# Patient Record
Sex: FEMALE | Race: WHITE | Employment: UNEMPLOYED | URBAN - METROPOLITAN AREA
[De-identification: names, ages, dates, MRNs, and addresses within clinical notes are randomized per-mention and may not be internally consistent; named-entity substitution may affect disease eponyms.]

---

## 2017-04-03 ENCOUNTER — OFFICE VISIT (OUTPATIENT)
Dept: FAMILY MEDICINE CLINIC | Age: 57
End: 2017-04-03

## 2017-04-03 VITALS
SYSTOLIC BLOOD PRESSURE: 116 MMHG | DIASTOLIC BLOOD PRESSURE: 68 MMHG | HEART RATE: 96 BPM | TEMPERATURE: 98.3 F | OXYGEN SATURATION: 96 % | WEIGHT: 174.6 LBS | BODY MASS INDEX: 27.35 KG/M2

## 2017-04-03 DIAGNOSIS — L25.9 CONTACT DERMATITIS, UNSPECIFIED CONTACT DERMATITIS TYPE, UNSPECIFIED TRIGGER: ICD-10-CM

## 2017-04-03 DIAGNOSIS — R21 RASH: Primary | ICD-10-CM

## 2017-04-03 RX ORDER — METHYLPREDNISOLONE ACETATE 80 MG/ML
80 INJECTION, SUSPENSION INTRA-ARTICULAR; INTRALESIONAL; INTRAMUSCULAR; SOFT TISSUE ONCE
Qty: 1 VIAL | Refills: 0
Start: 2017-04-03 | End: 2017-04-03

## 2017-04-03 RX ORDER — CETIRIZINE HCL 10 MG
10 TABLET ORAL DAILY
Qty: 30 TAB | Refills: 0 | Status: SHIPPED | OUTPATIENT
Start: 2017-04-03 | End: 2020-07-01

## 2017-04-03 NOTE — MR AVS SNAPSHOT
Visit Information Date & Time Provider Department Dept. Phone Encounter #  
 4/3/2017  1:15 PM Kadie Chester [facility] [phone] Upcoming Health Maintenance Date Due  
 BREAST CANCER SCRN MAMMOGRAM 11/23/2018 COLONOSCOPY 6/1/2020 DTaP/Tdap/Td series (2 - Td) 10/27/2025 Allergies as of 4/3/2017  Review Complete On: 4/3/2017 By: Kadie Chester MD  
  
 Severity Noted Reaction Type Reactions Morphine High 03/19/2010   Side Effect Anaphylaxis Current Immunizations  Reviewed on 9/27/2016 Name Date H1N1 FLU VACCINE 2/9/2010 Influenza Vaccine 10/9/2013 Influenza Vaccine Horacio Feeler) 9/27/2016, 11/7/2014 Influenza Vaccine (Quad) PF 10/27/2015 Influenza Vaccine Split 10/3/2012, 10/25/2011 Pneumococcal Vaccine (Unspecified Type) 10/3/2012 Tdap 10/27/2015 Not reviewed this visit You Were Diagnosed With   
  
 Codes Comments Rash    -  Primary ICD-10-CM: R21 
ICD-9-CM: 782.1 Contact dermatitis, unspecified contact dermatitis type, unspecified trigger     ICD-10-CM: L25.9 ICD-9-CM: 692.9 Vitals BP Pulse Temp Weight(growth percentile) SpO2 BMI  
 116/68 (BP 1 Location: Left arm, BP Patient Position: Sitting) 96 98.3 °F (36.8 °C) 174 lb 9.6 oz (79.2 kg) 96% 27.35 kg/m2 OB Status Smoking Status Hysterectomy Never Smoker BMI and BSA Data Body Mass Index Body Surface Area  
 27.35 kg/m 2 1.93 m 2 Preferred Pharmacy Pharmacy Name Phone [phone] [facility] [phone] Your Updated Medication List  
  
   
This list is accurate as of: 4/3/17  2:22 PM.  Always use your most recent med list.  
  
  
  
  
 azaTHIOprine 50 mg tablet Commonly known as:  [employer] Take 50 mg by mouth daily. cetirizine 10 mg tablet Commonly known as:  ZYRTEC Take 1 Tab by mouth daily. cyanocobalamin 1,000 mcg/mL injection Commonly known as:  VITAMIN B12  
1,000 mcg by IntraMUSCular route every fourteen (14) days. cycloSPORINE 100 mg capsule Commonly known as:  SandIMMUNE Take 75 mg by mouth two (2) times a day.  
  
 gabapentin 800 mg tablet Commonly known as:  NEURONTIN Take 800 mg by mouth two (2) times a day. methylPREDNISolone acetate 80 mg/mL injection Commonly known as:  DEPO-MEDROL 1 mL by IntraMUSCular route once for 1 dose. MIRAPEX 0.125 mg tablet Generic drug:  pramipexole Take 0.125 mg by mouth nightly. pravastatin 20 mg tablet Commonly known as:  PRAVACHOL Take 1 Tab by mouth nightly. predniSONE 5 mg tablet Commonly known as:  Niya Duane Take 2.5 mg by mouth daily. VITAMIN D2 50,000 unit capsule Generic drug:  ergocalciferol Take 50,000 Units by mouth every seven (7) days. ZANTAC PO Take 150 mg by mouth daily as needed. Prescriptions Sent to Pharmacy Refills  
 cetirizine (ZYRTEC) 10 mg tablet 0 Sig: Take 1 Tab by mouth daily. Class: Normal  
 Pharmacy: [address] Ph #: [phone] Route: Oral  
  
We Performed the Following METHYLPREDNISOLONE ACETATE INJECTION 80 MG [ [facility]] [place] THER/PROPH/DIAG INJECTION, SUBCUT/IM [id number] CPT(R)] Patient Instructions Zyrtec and Zantac daily. Call if not better in 2-3 days. Rash: Care Instructions Your Care Instructions A rash is any irritation or inflammation of the skin. Rashes have many possible causes, including allergy, infection, illness, heat, and emotional stress. Follow-up care is a key part of your treatment and safety. Be sure to make and go to all appointments, and call your doctor if you are having problems. Its also a good idea to know your test results and keep a list of the medicines you take. How can you care for yourself at home? · Wash the area with water only. Soap can make dryness and itching worse. Pat dry. · Put cold, wet cloths on the rash to reduce itching. · Keep cool, and stay out of the sun. · Leave the rash open to the air as much of the time as possible. · Sometimes petroleum jelly (Vaseline) can help relieve the discomfort caused by a rash. A moisturizing lotion, such as Cetaphil, also may help. Calamine lotion may help for rashes caused by contact with something (such as a plant or soap) that irritated the skin. Use it 3 or 4 times a day. · If your doctor prescribed a cream, use it as directed. If your doctor prescribed medicine, take it exactly as directed. · If your rash itches so badly that it interferes with your normal activities, take an over-the-counter antihistamine, such as diphenhydramine (Benadryl) or loratadine (Claritin). Read and follow all instructions on the label. When should you call for help? Call your doctor now or seek immediate medical care if: 
· You have signs of infection, such as: 
¨ Increased pain, swelling, warmth, or redness. ¨ Red streaks leading from the area. ¨ Pus draining from the area. ¨ A fever. · You have joint pain along with the rash. Watch closely for changes in your health, and be sure to contact your doctor if: 
· Your rash is changing or getting worse. For example, call if you have pain along with the rash, the rash is spreading, or you have new blisters. · You do not get better after 1 week. Where can you learn more? Go to http://tin-tabby.info/. Enter Q769 in the search box to learn more about \"Rash: Care Instructions. \" Current as of: October 13, 2016 Content Version: 11.2 © 4453-6428 [employer]. Care instructions adapted under license by [employer] (which disclaims liability or warranty for this information).  If you have questions about a medical condition or this instruction, always ask your healthcare professional. [address] any warranty or liability for your use of this information. Introducing [facility] SERVICES! [employer] introduces Translimit patient portal. Now you can access parts of your medical record, email your doctor's office, and request medication refills online. 1. In your internet browser, go to [email or web address] 2. Click on the First Time User? Click Here link in the Sign In box. You will see the New Member Sign Up page. 3. Enter your Translimit Access Code exactly as it appears below. You will not need to use this code after youve completed the sign-up process. If you do not sign up before the expiration date, you must request a new code. · Translimit Access Code: [email or web address] Expires: 7/2/2017  2:21 PM 
 
4. Enter the last four digits of your Social Security Number (xxxx) and Date of Birth (mm/dd/yyyy) as indicated and click Submit. You will be taken to the next sign-up page. 5. Create a Translimit ID. This will be your Translimit login ID and cannot be changed, so think of one that is secure and easy to remember. 6. Create a Translimit password. You can change your password at any time. 7. Enter your Password Reset Question and Answer. This can be used at a later time if you forget your password. 8. Enter your e-mail address. You will receive e-mail notification when new information is available in [address]. 9. Click Sign Up. You can now view and download portions of your medical record. 10. Click the Download Summary menu link to download a portable copy of your medical information. If you have questions, please visit the Frequently Asked Questions section of the Translimit website. Remember, Translimit is NOT to be used for urgent needs. For medical emergencies, dial 911. Now available from your iPhone and Android! Please provide this summary of care documentation to your next provider. Your primary care clinician is listed as [place]ck[place]ley [place]. If you have any questions after today's visit, please call [phone].

## 2017-04-03 NOTE — PROGRESS NOTES
Chief Complaint   Patient presents with    Rash     trunk, neck, arms, legs and back. Methylprednisolone 80mg given today per Dr. Rosa Suarez.

## 2017-04-03 NOTE — PROGRESS NOTES
Subjective:   Maria Dolores Gillis is a 56 y.o. female who complains of itching rash starting on chest, spreading to back and trunk. Worse with warm bath. No recent medication changes. Currently on prednisone 2.5mg daily for immunosuppression after kidney transplant. Had a slight cold. No history of allergies. Tried benadryl, hydrocortisone cream, calamine lotion and did not help it. Past Medical History:   Diagnosis Date    Basal cell carcinoma in situ of skin     removed from left cheek and left arm    Hip pain     dr. Asha Barbour Hyperlipidemia     Kidney replaced by transplant 1990    unknown etiology, Dr. Wan Up Peripheral neuropathy [facility])      Social History   Substance Use Topics    Smoking status: Never Smoker    Smokeless tobacco: Never Used    Alcohol use No     Current Outpatient Prescriptions on File Prior to Visit   Medication Sig Dispense Refill    pravastatin (PRAVACHOL) 20 mg tablet Take 1 Tab by mouth nightly. 90 Tab 3    azaTHIOprine (IMURAN) 50 mg tablet Take 50 mg by mouth daily.  ergocalciferol (VITAMIN D2) 50,000 unit capsule Take 50,000 Units by mouth every seven (7) days.  RANITIDINE HCL (ZANTAC PO) Take 150 mg by mouth daily as needed.  cyclosporine (SANDIMMUNE) 100 mg capsule Take 75 mg by mouth two (2) times a day.  predniSONE (DELTASONE) 5 mg tablet Take 2.5 mg by mouth daily.  pramipexole (MIRAPEX) 0.125 mg tablet Take 0.125 mg by mouth nightly.  gabapentin (NEURONTIN) 800 mg tablet Take 800 mg by mouth two (2) times a day.  cyanocobalamin (VITAMIN B12) 1,000 mcg/mL injection 1,000 mcg by IntraMUSCular route every fourteen (14) days. No current facility-administered medications on file prior to visit. Allergies   Allergen Reactions    Morphine Anaphylaxis        Review of Systems  Pertinent items are noted in HPI.     Objective:     Visit Vitals    /68 (BP 1 Location: Left arm, BP Patient Position: Sitting)    Pulse 96    Temp 98.3 °F (36.8 °C)    Wt 174 lb 9.6 oz (79.2 kg)    SpO2 96%    BMI 27.35 kg/m2     General:   alert, cooperative and no distress   Eyes: conjunctivae/scleras clear. PERRL, EOM's intact   Ears: External auditory canals clear, tympanic membranes clear       Mouth:  No oral lesions, mild pharyngeal erythema, no exudates   skin Red maculopapular rash spread on face, trunk, arms and extremities   Heart: S1 and S2 normal,no murmurs noted    Lungs: Clear to auscultation bilaterally, no increased work of breathing       Extremities: No edema or cyanosis              Assessment/Plan:       ICD-10-CM ICD-9-CM    1. Rash R21 782.1    2. Contact dermatitis, unspecified contact dermatitis type, unspecified trigger - will try steroid, if having persistent symptoms will return to transplant clinic to be sure there's no graft vs host or opportunistic infection. L25.9 692.9 METHYLPREDNISOLONE ACETATE INJECTION 80 MG      [place] THER/PROPH/DIAG INJECTION, SUBCUT/IM       Orders Placed This Encounter    METHYLPREDNISOLONE ACETATE INJECTION 80 MG     Order Specific Question:   Dose     Answer:   80mg     Order Specific Question:   Site     Answer:   RIGHT DELTOID     Order Specific Question:   Expiration Date     Answer:   2019     Order Specific Question:   Lot#     Answer:   [id number]     Order Specific Question:        Answer:   Melene Parents     Order Specific Question:   Charge Quantity? Answer:   1     Order Specific Question:   Perfomed by/Witnessed by: Answer:   aps     Order Specific Question:   NDC#     Answer:   [id number]    [place] THER/PROPH/DIAG INJECTION, SUBCUT/IM    cetirizine (ZYRTEC) 10 mg tablet     Sig: Take 1 Tab by mouth daily. Dispense:  30 Tab     Refill:  0    methylPREDNISolone acetate (DEPO-MEDROL) 80 mg/mL injection     Si mL by IntraMUSCular route once for 1 dose. Dispense:  1 Vial     Refill:  0       Verbal and written instructions (see AVS) provided.   Patient expresses understanding of diagnosis and treatment plan.

## 2017-04-03 NOTE — PATIENT INSTRUCTIONS
Zyrtec and Zantac daily. Call if not better in 2-3 days. Rash: Care Instructions  Your Care Instructions  A rash is any irritation or inflammation of the skin. Rashes have many possible causes, including allergy, infection, illness, heat, and emotional stress. Follow-up care is a key part of your treatment and safety. Be sure to make and go to all appointments, and call your doctor if you are having problems. Its also a good idea to know your test results and keep a list of the medicines you take. How can you care for yourself at home? · Wash the area with water only. Soap can make dryness and itching worse. Pat dry. · Put cold, wet cloths on the rash to reduce itching. · Keep cool, and stay out of the sun. · Leave the rash open to the air as much of the time as possible. · Sometimes petroleum jelly (Vaseline) can help relieve the discomfort caused by a rash. A moisturizing lotion, such as Cetaphil, also may help. Calamine lotion may help for rashes caused by contact with something (such as a plant or soap) that irritated the skin. Use it 3 or 4 times a day. · If your doctor prescribed a cream, use it as directed. If your doctor prescribed medicine, take it exactly as directed. · If your rash itches so badly that it interferes with your normal activities, take an over-the-counter antihistamine, such as diphenhydramine (Benadryl) or loratadine (Claritin). Read and follow all instructions on the label. When should you call for help? Call your doctor now or seek immediate medical care if:  · You have signs of infection, such as:  ¨ Increased pain, swelling, warmth, or redness. ¨ Red streaks leading from the area. ¨ Pus draining from the area. ¨ A fever. · You have joint pain along with the rash. Watch closely for changes in your health, and be sure to contact your doctor if:  · Your rash is changing or getting worse.  For example, call if you have pain along with the rash, the rash is spreading, or you have new blisters. · You do not get better after 1 week. Where can you learn more? Go to http://tin-tabby.info/. Enter Y299 in the search box to learn more about \"Rash: Care Instructions. \"  Current as of: October 13, 2016  Content Version: 11.2  © 3042-5010 [employer]. Care instructions adapted under license by [employer] (which disclaims liability or warranty for this information). If you have questions about a medical condition or this instruction, always ask your healthcare professional. [place] [address] any warranty or liability for your use of this information.

## 2017-07-08 ENCOUNTER — APPOINTMENT (OUTPATIENT)
Dept: GENERAL RADIOLOGY | Age: 57
End: 2017-07-08
Attending: EMERGENCY MEDICINE
Payer: COMMERCIAL

## 2017-07-08 ENCOUNTER — HOSPITAL ENCOUNTER (EMERGENCY)
Age: 57
Discharge: HOME | End: 2017-07-08
Attending: EMERGENCY MEDICINE
Payer: COMMERCIAL

## 2017-07-08 VITALS
HEART RATE: 63 BPM | BODY MASS INDEX: 26.94 KG/M2 | RESPIRATION RATE: 18 BRPM | WEIGHT: 172 LBS | OXYGEN SATURATION: 97 % | TEMPERATURE: 98.1 F | DIASTOLIC BLOOD PRESSURE: 77 MMHG | SYSTOLIC BLOOD PRESSURE: 130 MMHG

## 2017-07-08 DIAGNOSIS — M25.562 ACUTE PAIN OF LEFT KNEE: Primary | ICD-10-CM

## 2017-07-08 PROCEDURE — L1830 KO IMMOB CANVAS LONG PRE OTS: HCPCS

## 2017-07-08 PROCEDURE — 73562 X-RAY EXAM OF KNEE 3: CPT

## 2017-07-08 PROCEDURE — 99283 EMERGENCY DEPT VISIT LOW MDM: CPT

## 2017-07-08 RX ORDER — OXYCODONE AND ACETAMINOPHEN 5; 325 MG/1; MG/1
1 TABLET ORAL
Qty: 12 TAB | Refills: 0 | Status: SHIPPED | OUTPATIENT
Start: 2017-07-08 | End: 2017-11-09 | Stop reason: ALTCHOICE

## 2017-07-08 NOTE — DISCHARGE INSTRUCTIONS
Knee Pain or Injury: Care Instructions  Your Care Instructions    Injuries are a common cause of knee problems. Sudden (acute) injuries may be caused by a direct blow to the knee. They can also be caused by abnormal twisting, bending, or falling on the knee. Pain, bruising, or swelling may be severe, and may start within minutes of the injury. Overuse is another cause of knee pain. Other causes are climbing stairs, kneeling, and other activities that use the knee. Everyday wear and tear, especially as you get older, also can cause knee pain. Rest, along with home treatment, often relieves pain and allows your knee to heal. If you have a serious knee injury, you may need tests and treatment. Follow-up care is a key part of your treatment and safety. Be sure to make and go to all appointments, and call your doctor if you are having problems. It's also a good idea to know your test results and keep a list of the medicines you take. How can you care for yourself at home? · Be safe with medicines. Read and follow all instructions on the label. ¨ If the doctor gave you a prescription medicine for pain, take it as prescribed. ¨ If you are not taking a prescription pain medicine, ask your doctor if you can take an over-the-counter medicine. · Rest and protect your knee. Take a break from any activity that may cause pain. · Put ice or a cold pack on your knee for 10 to 20 minutes at a time. Put a thin cloth between the ice and your skin. · Prop up a sore knee on a pillow when you ice it or anytime you sit or lie down for the next 3 days. Try to keep it above the level of your heart. This will help reduce swelling. · If your knee is not swollen, you can put moist heat, a heating pad, or a warm cloth on your knee. · If your doctor recommends an elastic bandage, sleeve, or other type of support for your knee, wear it as directed.   · Follow your doctor's instructions about how much weight you can put on your leg. Use a cane, crutches, or a walker as instructed. · Follow your doctor's instructions about activity during your healing process. If you can do mild exercise, slowly increase your activity. · Reach and stay at a healthy weight. Extra weight can strain the joints, especially the knees and hips, and make the pain worse. Losing even a few pounds may help. When should you call for help? Call 911 anytime you think you may need emergency care. For example, call if:  · You have symptoms of a blood clot in your lung (called a pulmonary embolism). These may include:  ¨ Sudden chest pain. ¨ Trouble breathing. ¨ Coughing up blood. Call your doctor now or seek immediate medical care if:  · You have severe or increasing pain. · Your leg or foot turns cold or changes color. · You cannot stand or put weight on your knee. · Your knee looks twisted or bent out of shape. · You cannot move your knee. · You have signs of infection, such as:  ¨ Increased pain, swelling, warmth, or redness. ¨ Red streaks leading from the knee. ¨ Pus draining from a place on your knee. ¨ A fever. · You have signs of a blood clot in your leg (called a deep vein thrombosis), such as:  ¨ Pain in your calf, back of the knee, thigh, or groin. ¨ Redness and swelling in your leg or groin. Watch closely for changes in your health, and be sure to contact your doctor if:  · You have tingling, weakness, or numbness in your knee. · You have any new symptoms, such as swelling. · You have bruises from a knee injury that last longer than 2 weeks. · You do not get better as expected. Where can you learn more? Go to http://tin-tabby.info/. Enter K195 in the search box to learn more about \"Knee Pain or Injury: Care Instructions. \"  Current as of: March 20, 2017  Content Version: 11.3  © 7627-8861 [employer].  Care instructions adapted under license by [employer] (which disclaims liability or warranty for this information). If you have questions about a medical condition or this instruction, always ask your healthcare professional. [place] [address] any warranty or liability for your use of this information.

## 2017-07-08 NOTE — ED PROVIDER NOTES
HPI Comments: Astrid Rosenthal, 56 y.o. female, with PMHx of kidney transplant, HLD, hip pain, and peripheral neuropathy presents ambulatory to [facility] ED with cc of left-sided knee pain and swelling. Patient states that she fell in a deep hole and heard a \"crunch\" sound when she landed. She notes that the pain increases upon bending and ambulating. She denies hitting her head and denies LOC. She denies any other sx. PCP: Wilmer Grier MD    Social history significant for: - Tobacco, - EtOH, - Illicit Drug Use    There are no other complaints, changes, or physical findings at this time. The history is provided by the patient. No  was used. Past Medical History:   Diagnosis Date    Basal cell carcinoma in situ of skin     removed from left cheek and left arm    Hip pain     dr. Rosalio Munguia Hyperlipidemia     Kidney replaced by transplant 1990    unknown etiology, Dr. Nikkie Mike Peripheral neuropathy [facility])        Past Surgical History:   Procedure Laterality Date    HX CHOLECYSTECTOMY      HX HYSTERECTOMY      HX PREMALIG/BENIGN SKIN LESION EXCISION  2017    HX TRANSPLANT  1990    kidney         Family History:   Problem Relation Age of Onset    Cancer Mother      breast    SLE Mother     Breast Cancer Mother      35s    Emphysema Father     Breast Cancer Maternal Grandmother      late 62s       Social History     Social History    Marital status:      Spouse name: N/A    Number of children: N/A    Years of education: N/A     Occupational History    Not on file. Social History Main Topics    Smoking status: Never Smoker    Smokeless tobacco: Never Used    Alcohol use No    Drug use: No    Sexual activity: Yes     Partners: Male     Other Topics Concern    Not on file     Social History Narrative    . Grandchildren live with her. ALLERGIES: Morphine    Review of Systems   Constitutional: Negative for activity change, chills and fever. HENT: Negative for congestion and sore throat. Eyes: Negative for pain and redness. Respiratory: Negative for cough, chest tightness and shortness of breath. Cardiovascular: Negative for chest pain and palpitations. Gastrointestinal: Negative for abdominal pain, diarrhea, nausea and vomiting. Genitourinary: Negative for dysuria, frequency and urgency. Musculoskeletal: Positive for arthralgias (left knee) and joint swelling (left knee). Negative for back pain and neck pain. Skin: Negative for rash. Neurological: Negative for syncope, light-headedness and headaches. Psychiatric/Behavioral: Negative for confusion. All other systems reviewed and are negative. Patient Vitals for the past 12 hrs:   Temp Pulse Resp BP SpO2   07/08/17 1455 98.1 °F (36.7 °C) 63 18 130/77 97 %     Physical Exam   Constitutional: She is oriented to person, place, and time. She appears well-developed and well-nourished. No distress. HENT:   Head: Normocephalic and atraumatic. Right Ear: External ear normal.   Left Ear: External ear normal.   Nose: Nose normal.   Mouth/Throat: Oropharynx is clear and moist. No oropharyngeal exudate. Eyes: Conjunctivae and EOM are normal. Pupils are equal, round, and reactive to light. Right eye exhibits no discharge. Left eye exhibits no discharge. No scleral icterus. Neck: Normal range of motion. Neck supple. No JVD present. No tracheal deviation present. Cardiovascular: Normal rate, regular rhythm, normal heart sounds and intact distal pulses. Exam reveals no gallop and no friction rub. No murmur heard. Pulmonary/Chest: Effort normal and breath sounds normal. No respiratory distress. She has no wheezes. She has no rales. She exhibits no tenderness. Abdominal: Soft. Bowel sounds are normal. She exhibits no distension and no mass. There is no tenderness. There is no rebound and no guarding. Musculoskeletal: She exhibits no edema.    + swelling, tenderness and decreased active and passing ROM secondary to pain of left knee. Lymphadenopathy:     She has no cervical adenopathy. Neurological: She is alert and oriented to person, place, and time. She has normal reflexes. No cranial nerve deficit. She exhibits normal muscle tone. Coordination normal.   Skin: Skin is warm and dry. She is not diaphoretic. Psychiatric: She has a normal mood and affect. Her behavior is normal. Judgment and thought content normal.   Nursing note and vitals reviewed. MDM  Number of Diagnoses or Management Options  Diagnosis management comments: DDx: sprain, strain, fracture. Amount and/or Complexity of Data Reviewed  Tests in the radiology section of CPT®: ordered and reviewed  Review and summarize past medical records: yes  Independent visualization of images, tracings, or specimens: yes    Patient Progress  Patient progress: stable    ED Course       Procedures      Progress Note:  4:26 PM  Pt will ambulate with walker, will place knee in immobilizer. Procedure Note - Knee Immobilizer Placement  4:28 PM  Performed by: Ashley Camargo PA-C  Splint to patient's left knee in the extended position. Splint in good position. N/V intact after treatment. The procedure took 1-15 minutes, and patient tolerated well. Written by Vandana Louie ED Scribe, as dictated by Ashley Camargo PA-C. IMAGING RESULTS:  XR KNEE LT 3 V   Final Result   EXAM:  XR KNEE LT 3 V     INDICATION:   Left knee pain following injury.      COMPARISON: None.     FINDINGS: Three views of the left knee demonstrate no fracture or other acute  osseous or articular abnormality. There is no effusion.     IMPRESSION  IMPRESSION:  No acute abnormality. IMPRESSION:  1. Acute pain of left knee        PLAN:  1.  Discharge   Follow-up Information     Follow up With Details Comments [address] Dale MD In 2 days As needed [address], DO In 2 days As needed [address] [address]           Return to ED if worse       Discharge Note:  4:27 PM  The pt is ready for discharge. The pt's signs, symptoms, diagnosis, and discharge instructions have been discussed and pt has conveyed their understanding. The pt is to follow up as recommended or return to ER should their symptoms worsen. Plan has been discussed and pt is in agreement. This note is prepared by Adria Wallace, acting as a Scribe for [employer] . [employer] : The scribe's documentation has been prepared under my direction and personally reviewed by me in its entirety. I confirm that the notes above accurately reflects all work, treatment, procedures, and medical decision making performed by me.

## 2017-10-06 ENCOUNTER — CLINICAL SUPPORT (OUTPATIENT)
Dept: FAMILY MEDICINE CLINIC | Age: 57
End: 2017-10-06

## 2017-10-06 DIAGNOSIS — Z23 ENCOUNTER FOR IMMUNIZATION: Primary | ICD-10-CM

## 2017-10-06 NOTE — PATIENT INSTRUCTIONS
Vaccine Information Statement    Influenza (Flu) Vaccine (Inactivated or Recombinant): What you need to know    Many Vaccine Information Statements are available in [place] and other languages. See www.immunize.org/vis  Hojas de Información Sobre Vacunas están disponibles en Español y en muchos otros idiomas. Visite www.immunize.org/vis    1. Why get vaccinated? Influenza (flu) is a contagious disease that spreads around the [place] every year, usually between October and May. Flu is caused by influenza viruses, and is spread mainly by coughing, sneezing, and close contact. Anyone can get flu. Flu strikes suddenly and can last several days. Symptoms vary by age, but can include:   fever/chills   sore throat   muscle aches   fatigue   cough   headache    runny or stuffy nose    Flu can also lead to pneumonia and blood infections, and cause diarrhea and seizures in children. If you have a medical condition, such as heart or lung disease, flu can make it worse. Flu is more dangerous for some people. Infants and young children, people 72years of age and older, pregnant women, and people with certain health conditions or a weakened immune system are at greatest risk. Each year thousands of people in the [facility] die from flu, and many more are hospitalized. Flu vaccine can:   keep you from getting flu,   make flu less severe if you do get it, and   keep you from spreading flu to your family and other people. 2. Inactivated and recombinant flu vaccines    A dose of flu vaccine is recommended every flu season. Children 6 months through 6years of age may need two doses during the same flu season. Everyone else needs only one dose each flu season.        Some inactivated flu vaccines contain a very small amount of a mercury-based preservative called thimerosal. Studies have not shown thimerosal in vaccines to be harmful, but flu vaccines that do not contain thimerosal are available. There is no live flu virus in flu shots. They cannot cause the flu. There are many flu viruses, and they are always changing. Each year a new flu vaccine is made to protect against three or four viruses that are likely to cause disease in the upcoming flu season. But even when the vaccine doesnt exactly match these viruses, it may still provide some protection    Flu vaccine cannot prevent:   flu that is caused by a virus not covered by the vaccine, or   illnesses that look like flu but are not. It takes about 2 weeks for protection to develop after vaccination, and protection lasts through the flu season. 3. Some people should not get this vaccine    Tell the person who is giving you the vaccine:     If you have any severe, life-threatening allergies. If you ever had a life-threatening allergic reaction after a dose of flu vaccine, or have a severe allergy to any part of this vaccine, you may be advised not to get vaccinated. Most, but not all, types of flu vaccine contain a small amount of egg protein.  If you ever had Guillain-Barré Syndrome (also called GBS). Some people with a history of GBS should not get this vaccine. This should be discussed with your doctor.  If you are not feeling well. It is usually okay to get flu vaccine when you have a mild illness, but you might be asked to come back when you feel better. 4. Risks of a vaccine reaction    With any medicine, including vaccines, there is a chance of reactions. These are usually mild and go away on their own, but serious reactions are also possible. Most people who get a flu shot do not have any problems with it.      Minor problems following a flu shot include:    soreness, redness, or swelling where the shot was given     hoarseness   sore, red or itchy eyes   cough   fever   aches   headache   itching   fatigue  If these problems occur, they usually begin soon after the shot and last 1 or 2 days. More serious problems following a flu shot can include the following:     There may be a small increased risk of Guillain-Barré Syndrome (GBS) after inactivated flu vaccine. This risk has been estimated at 1 or 2 additional cases per million people vaccinated. This is much lower than the risk of severe complications from flu, which can be prevented by flu vaccine.  Young children who get the flu shot along with pneumococcal vaccine (PCV13) and/or DTaP vaccine at the same time might be slightly more likely to have a seizure caused by fever. Ask your doctor for more information. Tell your doctor if a child who is getting flu vaccine has ever had a seizure. Problems that could happen after any injected vaccine:      People sometimes faint after a medical procedure, including vaccination. Sitting or lying down for about 15 minutes can help prevent fainting, and injuries caused by a fall. Tell your doctor if you feel dizzy, or have vision changes or ringing in the ears.  Some people get severe pain in the shoulder and have difficulty moving the arm where a shot was given. This happens very rarely.  Any medication can cause a severe allergic reaction. Such reactions from a vaccine are very rare, estimated at about 1 in a million doses, and would happen within a few minutes to a few hours after the vaccination. As with any medicine, there is a very remote chance of a vaccine causing a serious injury or death. The safety of vaccines is always being monitored. For more information, visit: www.cdc.gov/vaccinesafety/    5. What if there is a serious reaction? What should I look for?  Look for anything that concerns you, such as signs of a severe allergic reaction, very high fever, or unusual behavior.     Signs of a severe allergic reaction can include hives, swelling of the face and throat, difficulty breathing, a fast heartbeat, dizziness, and weakness - usually within a few minutes to a few hours after the vaccination. What should I do?  If you think it is a severe allergic reaction or other emergency that cant wait, call 9-1-1 and get the person to the nearest hospital. Otherwise, call your doctor.  Reactions should be reported to the Vaccine Adverse Event Reporting System (VAERS). Your doctor should file this report, or you can do it yourself through  the VAERS web site at www.vaers. [facility].gov, or by calling 6-814.433.8292. VAERS does not give medical advice. 6. The National Vaccine Injury Compensation Program    The [facility] Vaccine Injury Compensation Program (VICP) is a federal program that was created to compensate people who may have been injured by certain vaccines. Persons who believe they may have been injured by a vaccine can learn about the program and about filing a claim by calling 2-641.308.2775 or visiting the [employer] website at www.[facility].gov/vaccinecompensation. There is a time limit to file a claim for compensation. 7. How can I learn more?  Ask your healthcare provider. He or she can give you the vaccine package insert or suggest other sources of information.  Call your local or state health department.  Contact the Centers for Disease Control and Prevention (CDC):  - Call 0-611.731.5874 (1-800-CDC-INFO) or  - Visit CDCs website at www.cdc.gov/flu    Vaccine Information Statement   Inactivated Influenza Vaccine   8/7/2015  [address] Srinivasan [id number]    Department of Health and Human Services  Centers for Disease Control and Prevention    Office Use Only

## 2017-10-06 NOTE — MR AVS SNAPSHOT
Visit Information Date & Time Provider Department Dept. Phone Encounter #  
 10/6/2017  2:30 PM Parisa Quiñonez [place] [address] [phone] [id number] Upcoming Health Maintenance Date Due INFLUENZA AGE 9 TO ADULT 8/1/2017 BREAST CANCER SCRN MAMMOGRAM 11/23/2018 COLONOSCOPY 6/1/2020 DTaP/Tdap/Td series (2 - Td) 10/27/2025 Allergies as of 10/6/2017  Review Complete On: 7/8/2017 By: Mingo Nelson RN Severity Noted Reaction Type Reactions Morphine High 03/19/2010   Side Effect Anaphylaxis Current Immunizations  Reviewed on 9/27/2016 Name Date H1N1 FLU VACCINE 2/9/2010 Influenza Vaccine 10/9/2013 Influenza Vaccine Francisca [place]) 9/27/2016, 11/7/2014 Influenza Vaccine (Quad) PF  Incomplete, 10/27/2015 Influenza Vaccine Split 10/3/2012, 10/25/2011 Tdap 10/27/2015 ZZZ-RETIRED (DO NOT USE) Pneumococcal Vaccine (Unspecified Type) 10/3/2012 Not reviewed this visit You Were Diagnosed With   
  
 Codes Comments Encounter for immunization    -  Primary ICD-10-CM: E28 ICD-9-CM: V03.89 Vitals OB Status Smoking Status Hysterectomy Never Smoker Preferred Pharmacy Pharmacy Name Phone [phone] [facility] [phone] Your Updated Medication List  
  
   
This list is accurate as of: 10/6/17  4:01 PM.  Always use your most recent med list.  
  
  
  
  
 azaTHIOprine 50 mg tablet Commonly known as:  The Pepsi Take 50 mg by mouth daily. cetirizine 10 mg tablet Commonly known as:  ZYRTEC Take 1 Tab by mouth daily. cyanocobalamin 1,000 mcg/mL injection Commonly known as:  VITAMIN B12  
1,000 mcg by IntraMUSCular route every fourteen (14) days. cycloSPORINE 100 mg capsule Commonly known as:  SandIMMUNE Take 75 mg by mouth two (2) times a day.  
  
 gabapentin 800 mg tablet Commonly known as:  NEURONTIN Take 800 mg by mouth two (2) times a day. MIRAPEX 0.125 mg tablet Generic drug:  pramipexole Take 0.125 mg by mouth nightly. oxyCODONE-acetaminophen 5-325 mg per tablet Commonly known as:  PERCOCET Take 1 Tab by mouth every six (6) hours as needed for Pain. Max Daily Amount: 4 Tabs. pravastatin 20 mg tablet Commonly known as:  PRAVACHOL Take 1 Tab by mouth nightly. predniSONE 5 mg tablet Commonly known as:  Leija Fossa Take 2.5 mg by mouth daily. VITAMIN D2 50,000 unit capsule Generic drug:  ergocalciferol Take 50,000 Units by mouth every seven (7) days. ZANTAC PO Take 150 mg by mouth daily as needed. We Performed the Following INFLUENZA VIRUS VAC QUAD,SPLIT,PRESV FREE SYRINGE IM [id number] CPT(R)] WI IMMUNIZ ADMIN,1 SINGLE/COMB VAC/TOXOID [id number] CPT(R)] Patient Instructions Vaccine Information Statement Influenza (Flu) Vaccine (Inactivated or Recombinant): What you need to know Many Vaccine Information Statements are available in Bengali and other languages. See www.immunize.org/vis Hojas de Información Sobre Vacunas están disponibles en Español y en muchos otros idiomas. Visite www.immunize.org/vis 1. Why get vaccinated? Influenza (flu) is a contagious disease that spreads around the [place] every year, usually between October and May. Flu is caused by influenza viruses, and is spread mainly by coughing, sneezing, and close contact. Anyone can get flu. Flu strikes suddenly and can last several days. Symptoms vary by age, but can include: 
 fever/chills  sore throat  muscle aches  fatigue  cough  headache  runny or stuffy nose Flu can also lead to pneumonia and blood infections, and cause diarrhea and seizures in children. If you have a medical condition, such as heart or lung disease, flu can make it worse. Flu is more dangerous for some people. Infants and young children, people 72years of age and older, pregnant women, and people with certain health conditions or a weakened immune system are at greatest risk. Each year thousands of people in the [facility] die from flu, and many more are hospitalized. Flu vaccine can: 
 keep you from getting flu, 
 make flu less severe if you do get it, and 
 keep you from spreading flu to your family and other people. 2. Inactivated and recombinant flu vaccines A dose of flu vaccine is recommended every flu season. Children 6 months through 6years of age may need two doses during the same flu season. Everyone else needs only one dose each flu season. Some inactivated flu vaccines contain a very small amount of a mercury-based preservative called thimerosal. Studies have not shown thimerosal in vaccines to be harmful, but flu vaccines that do not contain thimerosal are available. There is no live flu virus in flu shots. They cannot cause the flu. There are many flu viruses, and they are always changing. Each year a new flu vaccine is made to protect against three or four viruses that are likely to cause disease in the upcoming flu season. But even when the vaccine doesnt exactly match these viruses, it may still provide some protection Flu vaccine cannot prevent: 
 flu that is caused by a virus not covered by the vaccine, or 
 illnesses that look like flu but are not. It takes about 2 weeks for protection to develop after vaccination, and protection lasts through the flu season. 3. Some people should not get this vaccine Tell the person who is giving you the vaccine:  If you have any severe, life-threatening allergies.    
If you ever had a life-threatening allergic reaction after a dose of flu vaccine, or have a severe allergy to any part of this vaccine, you may be advised not to get vaccinated. Most, but not all, types of flu vaccine contain a small amount of egg protein.  If you ever had Guillain-Barré Syndrome (also called GBS). Some people with a history of GBS should not get this vaccine. This should be discussed with your doctor.  If you are not feeling well. It is usually okay to get flu vaccine when you have a mild illness, but you might be asked to come back when you feel better. 4. Risks of a vaccine reaction With any medicine, including vaccines, there is a chance of reactions. These are usually mild and go away on their own, but serious reactions are also possible. Most people who get a flu shot do not have any problems with it. Minor problems following a flu shot include:  
 soreness, redness, or swelling where the shot was given  hoarseness  sore, red or itchy eyes  cough  fever  aches  headache  itching  fatigue If these problems occur, they usually begin soon after the shot and last 1 or 2 days. More serious problems following a flu shot can include the following:  There may be a small increased risk of Guillain-Barré Syndrome (GBS) after inactivated flu vaccine. This risk has been estimated at 1 or 2 additional cases per million people vaccinated. This is much lower than the risk of severe complications from flu, which can be prevented by flu vaccine.  Young children who get the flu shot along with pneumococcal vaccine (PCV13) and/or DTaP vaccine at the same time might be slightly more likely to have a seizure caused by fever. Ask your doctor for more information. Tell your doctor if a child who is getting flu vaccine has ever had a seizure. Problems that could happen after any injected vaccine:  People sometimes faint after a medical procedure, including vaccination.  Sitting or lying down for about 15 minutes can help prevent fainting, and injuries caused by a fall. Tell your doctor if you feel dizzy, or have vision changes or ringing in the ears.  Some people get severe pain in the shoulder and have difficulty moving the arm where a shot was given. This happens very rarely.  Any medication can cause a severe allergic reaction. Such reactions from a vaccine are very rare, estimated at about 1 in a million doses, and would happen within a few minutes to a few hours after the vaccination. As with any medicine, there is a very remote chance of a vaccine causing a serious injury or death. The safety of vaccines is always being monitored. For more information, visit: www.cdc.gov/vaccinesafety/ 
 
 
The [facility] Vaccine Injury Compensation Program (VICP) is a federal program that was created to compensate people who may have been injured by certain vaccines.  
 
Persons who believe they may have been injured by a vaccine can learn about the program and about filing a claim by calling 6-657.417.8344 or visiting the [employer] website at www.[facility].gov/vaccinecompensation. There is a time limit to file a claim for compensation. 7. How can I learn more?  Ask your healthcare provider. He or she can give you the vaccine package insert or suggest other sources of information.  Call your local or state health department.  Contact the Centers for Disease Control and Prevention (CDC): 
- Call 2-380.646.1004 (1-800-CDC-INFO) or 
- Visit CDCs website at www.cdc.gov/flu Vaccine Information Statement Inactivated Influenza Vaccine 8/7/2015 
[id number] [facility] Centers for Disease Control and Prevention Office Use Only Introducing [facility]! [facility] introduces [employer] patient portal. Now you can access parts of your medical record, email your doctor's office, and request medication refills online. 1. In your internet browser, go to [email or web address] 2. Click on the First Time User? Click Here link in the Sign In box. You will see the New Member Sign Up page. 3. Enter your [employer] Access Code exactly as it appears below. You will not need to use this code after youve completed the sign-up process. If you do not sign up before the expiration date, you must request a new code. · [employer] Access Code: [email or web address] Expires: 1/4/2018  4:01 PM 
 
4. Enter the last four digits of your Social Security Number (xxxx) and Date of Birth (mm/dd/yyyy) as indicated and click Submit. You will be taken to the next sign-up page. 5. Create a Tegile Systemst ID. This will be your [employer] login ID and cannot be changed, so think of one that is secure and easy to remember. 6. Create a [employer] System[employer] password. You can change your password at any time. 7. Enter your Password Reset Question and Answer. This can be used at a later time if you forget your password. 8. Enter your e-mail address.  You will receive e-mail notification when new information is available in giddy. 9. Click Sign Up. You can now view and download portions of your medical record. 10. Click the Download Summary menu link to download a portable copy of your medical information. If you have questions, please visit the Frequently Asked Questions section of the giddy website. Remember, giddy is NOT to be used for urgent needs. For medical emergencies, dial 911. Now available from your iPhone and Android! Please provide this summary of care documentation to your next provider. Your primary care clinician is listed as Vilma Elise. If you have any questions after today's visit, please call [phone].

## 2017-10-06 NOTE — PROGRESS NOTES
Lizandro Ly is a 56 y.o. female who presents for routine immunizations. She denies any symptoms , reactions or allergies that would exclude them from being immunized today. Risks and adverse reactions were discussed and the VIS was given to them. All questions were addressed. She was observed for 10 min post injection. There were no reactions observed.     Sarina Mcmillan LPN

## 2017-11-09 ENCOUNTER — OFFICE VISIT (OUTPATIENT)
Dept: FAMILY MEDICINE CLINIC | Age: 57
End: 2017-11-09

## 2017-11-09 VITALS
TEMPERATURE: 98.3 F | DIASTOLIC BLOOD PRESSURE: 69 MMHG | BODY MASS INDEX: 28.25 KG/M2 | HEIGHT: 67 IN | WEIGHT: 180 LBS | HEART RATE: 68 BPM | RESPIRATION RATE: 17 BRPM | OXYGEN SATURATION: 95 % | SYSTOLIC BLOOD PRESSURE: 121 MMHG

## 2017-11-09 DIAGNOSIS — N25.81 HYPERPARATHYROIDISM, SECONDARY RENAL (HCC): ICD-10-CM

## 2017-11-09 DIAGNOSIS — E78.5 HYPERLIPIDEMIA, UNSPECIFIED HYPERLIPIDEMIA TYPE: ICD-10-CM

## 2017-11-09 DIAGNOSIS — Z00.00 GENERAL MEDICAL EXAM: Primary | ICD-10-CM

## 2017-11-09 DIAGNOSIS — Z94.0 KIDNEY REPLACED BY TRANSPLANT: ICD-10-CM

## 2017-11-09 DIAGNOSIS — D64.9 ANEMIA, UNSPECIFIED TYPE: ICD-10-CM

## 2017-11-09 NOTE — PATIENT INSTRUCTIONS
Well Visit, Women 48 to 72: Care Instructions  Your Care Instructions    Physical exams can help you stay healthy. Your doctor has checked your overall health and may have suggested ways to take good care of yourself. He or she also may have recommended tests. At home, you can help prevent illness with healthy eating, regular exercise, and other steps. Follow-up care is a key part of your treatment and safety. Be sure to make and go to all appointments, and call your doctor if you are having problems. It's also a good idea to know your test results and keep a list of the medicines you take. How can you care for yourself at home? · Reach and stay at a healthy weight. This will lower your risk for many problems, such as obesity, diabetes, heart disease, and high blood pressure. · Get at least 30 minutes of exercise on most days of the week. Walking is a good choice. You also may want to do other activities, such as running, swimming, cycling, or playing tennis or team sports. · Do not smoke. Smoking can make health problems worse. If you need help quitting, talk to your doctor about stop-smoking programs and medicines. These can increase your chances of quitting for good. · Protect your skin from too much sun. When you're outdoors from 10 a.m. to 4 p.m., stay in the shade or cover up with clothing and a hat with a wide brim. Wear sunglasses that block UV rays. Even when it's cloudy, put broad-spectrum sunscreen (SPF 30 or higher) on any exposed skin. · See a dentist one or two times a year for checkups and to have your teeth cleaned. · Wear a seat belt in the car. · Limit alcohol to 1 drink a day. Too much alcohol can cause health problems. Follow your doctor's advice about when to have certain tests. These tests can spot problems early. · Cholesterol.  Your doctor will tell you how often to have this done based on your age, family history, or other things that can increase your risk for heart attack and stroke. · Blood pressure. Have your blood pressure checked during a routine doctor visit. Your doctor will tell you how often to check your blood pressure based on your age, your blood pressure results, and other factors. · Mammogram. Ask your doctor how often you should have a mammogram, which is an X-ray of your breasts. A mammogram can spot breast cancer before it can be felt and when it is easiest to treat. · Pap test and pelvic exam. Ask your doctor how often you should have a Pap test. You may not need to have a Pap test as often as you used to. · Vision. Have your eyes checked every year or two or as often as your doctor suggests. Some experts recommend that you have yearly exams for glaucoma and other age-related eye problems starting at age 48. · Hearing. Tell your doctor if you notice any change in your hearing. You can have tests to find out how well you hear. · Diabetes. Ask your doctor whether you should have tests for diabetes. · Colon cancer. You should begin tests for colon cancer at age 48. You may have one of several tests. Your doctor will tell you how often to have tests based on your age and risk. Risks include whether you already had a precancerous polyp removed from your colon or whether your parents, sisters and brothers, or children have had colon cancer. · Thyroid disease. Talk to your doctor about whether to have your thyroid checked as part of a regular physical exam. Women have an increased chance of a thyroid problem. · Osteoporosis. You should begin tests for bone density at age 72. If you are younger than 72, ask your doctor whether you have factors that may increase your risk for this disease. You may want to have this test before age 72. · Heart attack and stroke risk. At least every 4 to 6 years, you should have your risk for heart attack and stroke assessed.  Your doctor uses factors such as your age, blood pressure, cholesterol, and whether you smoke or have diabetes to show what your risk for a heart attack or stroke is over the next 10 years. When should you call for help? Watch closely for changes in your health, and be sure to contact your doctor if you have any problems or symptoms that concern you. Where can you learn more? Go to http://tin-tabby.info/. Enter B109 in the search box to learn more about \"Well Visit, Women 50 to 72: Care Instructions. \"  Current as of: May 12, 2017  Content Version: 11.4  © 6567-2136 [employer]. Care instructions adapted under license by [employer] (which disclaims liability or warranty for this information). If you have questions about a medical condition or this instruction, always ask your healthcare professional. [address] any warranty or liability for your use of this information.

## 2017-11-09 NOTE — MR AVS SNAPSHOT
Visit Information Date & Time Provider Department Dept. Phone Encounter #  
 11/9/2017 10:45 AM Yo Burton MD [address] [facility] [phone] [id number] Upcoming Health Maintenance Date Due  
 BREAST CANCER SCRN MAMMOGRAM 11/23/2018 COLONOSCOPY 6/1/2020 DTaP/Tdap/Td series (2 - Td) 10/27/2025 Allergies as of 11/9/2017  Review Complete On: 11/9/2017 By: Yo Burton MD  
  
 Severity Noted Reaction Type Reactions Morphine High 03/19/2010   Side Effect Anaphylaxis Current Immunizations  Reviewed on 10/6/2017 Name Date H1N1 FLU VACCINE 2/9/2010 Influenza Vaccine 10/9/2013 Influenza Vaccine [place] Going) 9/27/2016, 11/7/2014 Influenza Vaccine (Quad) PF 10/6/2017, 10/27/2015 Influenza Vaccine Split 10/3/2012, 10/25/2011 Tdap 10/27/2015 ZZZ-RETIRED (DO NOT USE) Pneumococcal Vaccine (Unspecified Type) 10/3/2012 Not reviewed this visit You Were Diagnosed With   
  
 Codes Comments General medical exam    -  Primary ICD-10-CM: Z00.00 ICD-9-CM: V70.9 Hyperparathyroidism, secondary renal ([facility] [address]     ICD-10-CM: N25.81 ICD-9-CM: 18.80 Kidney replaced by transplant     ICD-10-CM: Z94.0 ICD-9-CM: V42.0 Anemia, unspecified type     ICD-10-CM: D64.9 ICD-9-CM: 285.9 Hyperlipidemia, unspecified hyperlipidemia type     ICD-10-CM: E78.5 ICD-9-CM: 272.4 Vitals BP Pulse Temp Resp Height(growth percentile) Weight(growth percentile) 121/69 (BP 1 Location: Left arm, BP Patient Position: Sitting) 68 98.3 °F (36.8 °C) (Oral) 17 5' 7\" (1.702 m) 180 lb (81.6 kg) SpO2 BMI OB Status Smoking Status 95% 28.19 kg/m2 Hysterectomy Never Smoker BMI and BSA Data Body Mass Index Body Surface Area  
 28.19 kg/m 2 1.96 m 2 Preferred Pharmacy Pharmacy Name Phone [id number] [facility] [phone] Your Updated Medication List  
  
   
This list is accurate as of: 11/9/17 11:27 AM.  Always use your most recent med list.  
  
  
  
  
 azaTHIOprine 50 mg tablet Commonly known as:  [employer] Take 50 mg by mouth daily. cetirizine 10 mg tablet Commonly known as:  ZYRTEC Take 1 Tab by mouth daily. cyanocobalamin 1,000 mcg/mL injection Commonly known as:  VITAMIN B12  
1,000 mcg by IntraMUSCular route every fourteen (14) days. cycloSPORINE 100 mg capsule Commonly known as:  SandIMMUNE Take 75 mg by mouth two (2) times a day.  
  
 gabapentin 800 mg tablet Commonly known as:  NEURONTIN Take 800 mg by mouth two (2) times a day. MIRAPEX 0.125 mg tablet Generic drug:  pramipexole Take 0.125 mg by mouth nightly. pravastatin 20 mg tablet Commonly known as:  PRAVACHOL Take 1 Tab by mouth nightly. predniSONE 5 mg tablet Commonly known as:  Aquino Stair Take 2.5 mg by mouth daily. VITAMIN D2 50,000 unit capsule Generic drug:  ergocalciferol Take 50,000 Units by mouth every seven (7) days. ZANTAC PO Take 150 mg by mouth daily as needed. We Performed the Following CBC WITH AUTOMATED DIFF [14205 CPT(R)] LIPID PANEL [91359 CPT(R)] METABOLIC PANEL, COMPREHENSIVE [69132 CPT(R)] TSH 3RD GENERATION [26170 CPT(R)] Patient Instructions Well Visit, Women 48 to 72: Care Instructions Your Care Instructions Physical exams can help you stay healthy. Your doctor has checked your overall health and may have suggested ways to take good care of yourself. He or she also may have recommended tests. At home, you can help prevent illness with healthy eating, regular exercise, and other steps. Follow-up care is a key part of your treatment and safety. Be sure to make and go to all appointments, and call your doctor if you are having problems.  It's also a good idea to know your test results and keep a list of the medicines you take. How can you care for yourself at home? · Reach and stay at a healthy weight. This will lower your risk for many problems, such as obesity, diabetes, heart disease, and high blood pressure. · Get at least 30 minutes of exercise on most days of the week. Walking is a good choice. You also may want to do other activities, such as running, swimming, cycling, or playing tennis or team sports. · Do not smoke. Smoking can make health problems worse. If you need help quitting, talk to your doctor about stop-smoking programs and medicines. These can increase your chances of quitting for good. · Protect your skin from too much sun. When you're outdoors from 10 a.m. to 4 p.m., stay in the shade or cover up with clothing and a hat with a wide brim. Wear sunglasses that block UV rays. Even when it's cloudy, put broad-spectrum sunscreen (SPF 30 or higher) on any exposed skin. · See a dentist one or two times a year for checkups and to have your teeth cleaned. · Wear a seat belt in the car. · Limit alcohol to 1 drink a day. Too much alcohol can cause health problems. Follow your doctor's advice about when to have certain tests. These tests can spot problems early. · Cholesterol. Your doctor will tell you how often to have this done based on your age, family history, or other things that can increase your risk for heart attack and stroke. · Blood pressure. Have your blood pressure checked during a routine doctor visit. Your doctor will tell you how often to check your blood pressure based on your age, your blood pressure results, and other factors. · Mammogram. Ask your doctor how often you should have a mammogram, which is an X-ray of your breasts. A mammogram can spot breast cancer before it can be felt and when it is easiest to treat. · Pap test and pelvic exam. Ask your doctor how often you should have a Pap test. You may not need to have a Pap test as often as you used to. · Vision. Have your eyes checked every year or two or as often as your doctor suggests. Some experts recommend that you have yearly exams for glaucoma and other age-related eye problems starting at age 48. · Hearing. Tell your doctor if you notice any change in your hearing. You can have tests to find out how well you hear. · Diabetes. Ask your doctor whether you should have tests for diabetes. · Colon cancer. You should begin tests for colon cancer at age 48. You may have one of several tests. Your doctor will tell you how often to have tests based on your age and risk. Risks include whether you already had a precancerous polyp removed from your colon or whether your parents, sisters and brothers, or children have had colon cancer. · Thyroid disease. Talk to your doctor about whether to have your thyroid checked as part of a regular physical exam. Women have an increased chance of a thyroid problem. · Osteoporosis. You should begin tests for bone density at age 72. If you are younger than 72, ask your doctor whether you have factors that may increase your risk for this disease. You may want to have this test before age 72. · Heart attack and stroke risk. At least every 4 to 6 years, you should have your risk for heart attack and stroke assessed. Your doctor uses factors such as your age, blood pressure, cholesterol, and whether you smoke or have diabetes to show what your risk for a heart attack or stroke is over the next 10 years. When should you call for help? Watch closely for changes in your health, and be sure to contact your doctor if you have any problems or symptoms that concern you. Where can you learn more? Go to http://tin-tabby.info/. Enter X898 in the search box to learn more about \"Well Visit, Women 50 to 72: Care Instructions. \" Current as of: May 12, 2017 Content Version: 11.4 © 2146-5568 [employer].  Care instructions adapted under license by [address] (which disclaims liability or warranty for this information). If you have questions about a medical condition or this instruction, always ask your healthcare professional. [address] any warranty or liability for your use of this information. Introducing [facility]! Isidra Townsend introduces [employer] patient portal. Now you can access parts of your medical record, email your doctor's office, and request medication refills online. 1. In your internet browser, go to [email or web address] 2. Click on the First Time User? Click Here link in the Sign In box. You will see the New Member Sign Up page. 3. Enter your [employer] Access Code exactly as it appears below. You will not need to use this code after youve completed the sign-up process. If you do not sign up before the expiration date, you must request a new code. · [employer] Access Code: [email or web address] Expires: 1/4/2018  3:01 PM 
 
4. Enter the last four digits of your Social Security Number (xxxx) and Date of Birth (mm/dd/yyyy) as indicated and click Submit. You will be taken to the next sign-up page. 5. Create a [employer] ID. This will be your [employer] login ID and cannot be changed, so think of one that is secure and easy to remember. 6. Create a [employer] password. You can change your password at any time. 7. Enter your Password Reset Question and Answer. This can be used at a later time if you forget your password. 8. Enter your e-mail address. You will receive e-mail notification when new information is available in [address] 9. Click Sign Up. You can now view and download portions of your medical record. 10. Click the Download Summary menu link to download a portable copy of your medical information. If you have questions, please visit the Frequently Asked Questions section of the [employer] website.  Remember, [employer] is NOT to be used for urgent needs. For medical emergencies, dial 911. Now available from your iPhone and Android! Please provide this summary of care documentation to your next provider. Your primary care clinician is listed as Linh Washington. If you have any questions after today's visit, please call [phone].

## 2017-11-09 NOTE — PROGRESS NOTES
Chief Complaint   Patient presents with   [facility] Physical     Health Maintenance reviewed

## 2017-11-09 NOTE — PROGRESS NOTES
56 y.o. female presents for a physical.    Patient Active Problem List    Diagnosis    Hyperparathyroidism, secondary renal ([address]       Followed by nephrology      GERD (gastroesophageal reflux disease)     Well controlled on zantac. Denies pain, water brash or weight loss.  Anemia - no light headed or dizzy spells    Gout - no recent flares    Kidney replaced by transplant     unknown etiology, Dr. Donny Henson is nephrologist q3 months. Most recently creatinine is stable around 2.        Hyperlipidemia - Denies orthopnea, PND, light headedness, palpitations, or dyspnea on exertion. Weight has been stable.  Peripheral neuropathy - stable on gabapentin     History of Diverticulitis - no recent flares of fever or chills. Trying to follow a diet. Past Medical History:   Diagnosis Date    Basal cell carcinoma in situ of skin     removed from left cheek and left arm    Hip pain     dr. Jey Nguyen Hyperlipidemia     Kidney replaced by transplant 1990    unknown etiology, Dr. Austin Counts Peripheral neuropathy        Past Surgical History:   Procedure Laterality Date    HX CHOLECYSTECTOMY      HX HYSTERECTOMY      HX PREMALIG/BENIGN SKIN LESION EXCISION  2017    HX TRANSPLANT  1990    kidney       Family History   Problem Relation Age of Onset    Cancer Mother      breast    SLE Mother     Breast Cancer Mother      35s    Emphysema Father     Breast Cancer Maternal Grandmother      late 62s       Social History   Substance Use Topics    Smoking status: Never Smoker    Smokeless tobacco: Never Used    Alcohol use No       Social History     Social History Narrative    . Grandchildren live with her. There are no preventive care reminders to display for this patient. Outpatient Prescriptions Marked as Taking for the 11/9/17 encounter (Office Visit) with Ann Ladd MD   Medication Sig Dispense Refill    cetirizine (ZYRTEC) 10 mg tablet Take 1 Tab by mouth daily.  30 Tab 0    pravastatin (PRAVACHOL) 20 mg tablet Take 1 Tab by mouth nightly. 90 Tab 3    azaTHIOprine (IMURAN) 50 mg tablet Take 50 mg by mouth daily.  ergocalciferol (VITAMIN D2) 50,000 unit capsule Take 50,000 Units by mouth every seven (7) days.  RANITIDINE HCL (ZANTAC PO) Take 150 mg by mouth daily as needed.  cyclosporine (SANDIMMUNE) 100 mg capsule Take 75 mg by mouth two (2) times a day.  predniSONE (DELTASONE) 5 mg tablet Take 2.5 mg by mouth daily.  pramipexole (MIRAPEX) 0.125 mg tablet Take 0.125 mg by mouth nightly.  gabapentin (NEURONTIN) 800 mg tablet Take 800 mg by mouth two (2) times a day.  cyanocobalamin (VITAMIN B12) 1,000 mcg/mL injection 1,000 mcg by IntraMUSCular route every fourteen (14) days.          Allergies   Allergen Reactions    Morphine Anaphylaxis       Review of Systems:  Gen: no fatigue, fever, chills, weight loss or weight gain  Eyes: no excessive tearing, itching, or discharge  Nose: no rhinorrhea, no sinus pain  Mouth: no oral lesions, no sore throat  Resp: no shortness of breath, no wheezing, no cough  CV: no chest pain, no orthopnea, no paroxysmal nocturnal dyspnea, no lower extremity edema, no palpitations  Abd: no nausea, no heartburn, no diarrhea, no constipation, no abdominal pain  Neuro: no headaches, no syncope or presyncopal episodes  Endo: no polyuria, no polydipsia  Heme: no lymphadenopathy, no easy bruising or bleeding    Visit Vitals    /69 (BP 1 Location: Left arm, BP Patient Position: Sitting)    Pulse 68    Temp 98.3 °F (36.8 °C) (Oral)    Resp 17    Ht 5' 7\" (1.702 m)    Wt 180 lb (81.6 kg)    SpO2 95%    BMI 28.19 kg/m2     Gen: alert, oriented, no acute distress  Ears: external auditory canals clear, TMs without erythema or effusion  Eyes: pupils equal round reactive to light, sclera clear, conjunctiva clear  Oral: moist mucus membranes, no oral lesions, no pharyngeal inflammation or exudate  Neck: thyroid symmetric and not enlarged, no carotid bruits, no jugular vein distention  Resp: no increase work of breathing, lungs clear to ausculation bilaterally, no wheezing, rales or rhonchi  CV: S1, S2 normal. No murmurs, rubs, or gallops. Abd: soft, not tender, not distended. No hepatosplenomegaly. Normal bowel sounds. Neuro: cranial nerves intact, normal strength and movement in all extremities, reflexes and sensation intact and symmetric. Skin: no lesion or rash  Extremities: no cyanosis or edema    Assessment/Plan:    1. General medical exam  Age appropriate Health Maintenance activities reviewed with patient and updated. - CBC WITH AUTOMATED DIFF  - METABOLIC PANEL, COMPREHENSIVE  - LIPID PANEL  - TSH 3RD GENERATION    2. Hyperparathyroidism, secondary renal ([facility] [address]  Followed by nephrology    3. Kidney replaced by transplant  Followed by nephrology    4. Anemia, unspecified type  Stable renal origin    5. Hyperlipidemia, unspecified hyperlipidemia type  No changes in medications pending lab results. - CBC WITH AUTOMATED DIFF  - METABOLIC PANEL, COMPREHENSIVE  - LIPID PANEL  - TSH 3RD GENERATION      Health Maintenance reviewed - updated    Orders Placed This Encounter    CBC WITH AUTOMATED DIFF    METABOLIC PANEL, COMPREHENSIVE    LIPID PANEL    TSH 3RD GENERATION       Medications Discontinued During This Encounter   Medication Reason    oxyCODONE-acetaminophen (PERCOCET) 5-325 mg per tablet Therapy Completed       Current Outpatient Prescriptions   Medication Sig Dispense Refill    cetirizine (ZYRTEC) 10 mg tablet Take 1 Tab by mouth daily. 30 Tab 0    pravastatin (PRAVACHOL) 20 mg tablet Take 1 Tab by mouth nightly. 90 Tab 3    azaTHIOprine (IMURAN) 50 mg tablet Take 50 mg by mouth daily.  ergocalciferol (VITAMIN D2) 50,000 unit capsule Take 50,000 Units by mouth every seven (7) days.  RANITIDINE HCL (ZANTAC PO) Take 150 mg by mouth daily as needed.       cyclosporine (SANDIMMUNE) 100 mg capsule Take 75 mg by mouth two (2) times a day.  predniSONE (DELTASONE) 5 mg tablet Take 2.5 mg by mouth daily.  pramipexole (MIRAPEX) 0.125 mg tablet Take 0.125 mg by mouth nightly.  gabapentin (NEURONTIN) 800 mg tablet Take 800 mg by mouth two (2) times a day.  cyanocobalamin (VITAMIN B12) 1,000 mcg/mL injection 1,000 mcg by IntraMUSCular route every fourteen (14) days. Recommended healthy diet low in carbohydrates, fats, sodium and cholesterol. Recommended regular cardiovascular exercise 3-6 times per week for 30-60 minutes daily. Verbal and written instructions (see AVS) provided. Patient expresses understanding of diagnosis and treatment plan.

## 2017-11-10 LAB
ALBUMIN SERPL-MCNC: 4.3 G/DL (ref 3.5–5.5)
ALBUMIN/GLOB SERPL: 1.7 {RATIO} (ref 1.2–2.2)
ALP SERPL-CCNC: 88 IU/L (ref 39–117)
ALT SERPL-CCNC: 15 IU/L (ref 0–32)
AST SERPL-CCNC: 19 IU/L (ref 0–40)
BASOPHILS # BLD AUTO: 0 X10E3/UL (ref 0–0.2)
BASOPHILS NFR BLD AUTO: 1 %
BILIRUB SERPL-MCNC: 0.4 MG/DL (ref 0–1.2)
BUN SERPL-MCNC: 33 MG/DL (ref 6–24)
BUN/CREAT SERPL: 18 (ref 9–23)
CALCIUM SERPL-MCNC: 9.7 MG/DL (ref 8.7–10.2)
CHLORIDE SERPL-SCNC: 105 MMOL/L (ref 96–106)
CHOLEST SERPL-MCNC: 265 MG/DL (ref 100–199)
CO2 SERPL-SCNC: 21 MMOL/L (ref 18–29)
CREAT SERPL-MCNC: 1.83 MG/DL (ref 0.57–1)
EOSINOPHIL # BLD AUTO: 0.2 X10E3/UL (ref 0–0.4)
EOSINOPHIL NFR BLD AUTO: 2 %
ERYTHROCYTE [DISTWIDTH] IN BLOOD BY AUTOMATED COUNT: 14.4 % (ref 12.3–15.4)
GFR SERPLBLD CREATININE-BSD FMLA CKD-EPI: 31 ML/MIN/1.73
GFR SERPLBLD CREATININE-BSD FMLA CKD-EPI: 35 ML/MIN/1.73
GLOBULIN SER CALC-MCNC: 2.6 G/DL (ref 1.5–4.5)
GLUCOSE SERPL-MCNC: 103 MG/DL (ref 65–99)
HCT VFR BLD AUTO: 33.5 % (ref 34–46.6)
HDLC SERPL-MCNC: 52 MG/DL
HGB BLD-MCNC: 10.9 G/DL (ref 11.1–15.9)
IMM GRANULOCYTES # BLD: 0 X10E3/UL (ref 0–0.1)
IMM GRANULOCYTES NFR BLD: 0 %
INTERPRETATION, 910389: NORMAL
INTERPRETATION: NORMAL
LDLC SERPL CALC-MCNC: 166 MG/DL (ref 0–99)
LYMPHOCYTES # BLD AUTO: 2.1 X10E3/UL (ref 0.7–3.1)
LYMPHOCYTES NFR BLD AUTO: 26 %
MCH RBC QN AUTO: 27.9 PG (ref 26.6–33)
MCHC RBC AUTO-ENTMCNC: 32.5 G/DL (ref 31.5–35.7)
MCV RBC AUTO: 86 FL (ref 79–97)
MONOCYTES # BLD AUTO: 0.6 X10E3/UL (ref 0.1–0.9)
MONOCYTES NFR BLD AUTO: 7 %
NEUTROPHILS # BLD AUTO: 5 X10E3/UL (ref 1.4–7)
NEUTROPHILS NFR BLD AUTO: 64 %
PDF IMAGE, 910387: NORMAL
PLATELET # BLD AUTO: 325 X10E3/UL (ref 150–379)
POTASSIUM SERPL-SCNC: 4.6 MMOL/L (ref 3.5–5.2)
PROT SERPL-MCNC: 6.9 G/DL (ref 6–8.5)
RBC # BLD AUTO: 3.91 X10E6/UL (ref 3.77–5.28)
SODIUM SERPL-SCNC: 142 MMOL/L (ref 134–144)
TRIGL SERPL-MCNC: 235 MG/DL (ref 0–149)
TSH SERPL DL<=0.005 MIU/L-ACNC: 0.78 UIU/ML (ref 0.45–4.5)
VLDLC SERPL CALC-MCNC: 47 MG/DL (ref 5–40)
WBC # BLD AUTO: 7.8 X10E3/UL (ref 3.4–10.8)

## 2017-11-10 NOTE — PROGRESS NOTES
Ms. Jose Huang notified Dr. Chiki Topete said Labs are stable, but cholesterol not at goal.  Change pravastatin to 40mg daily please.  She voiced understanding and needs a refill so I sent the Pravastatin 40 mg to Dr. Monet Do

## 2017-11-13 RX ORDER — PRAVASTATIN SODIUM 40 MG/1
40 TABLET ORAL
Qty: 90 TAB | Refills: 3 | Status: SHIPPED | OUTPATIENT
Start: 2017-11-13 | End: 2018-11-16

## 2018-07-12 ENCOUNTER — OFFICE VISIT (OUTPATIENT)
Dept: FAMILY MEDICINE CLINIC | Age: 58
End: 2018-07-12

## 2018-07-12 VITALS
RESPIRATION RATE: 17 BRPM | SYSTOLIC BLOOD PRESSURE: 124 MMHG | TEMPERATURE: 99.4 F | BODY MASS INDEX: 28.56 KG/M2 | OXYGEN SATURATION: 96 % | HEART RATE: 74 BPM | WEIGHT: 182 LBS | HEIGHT: 67 IN | DIASTOLIC BLOOD PRESSURE: 78 MMHG

## 2018-07-12 DIAGNOSIS — K57.92 DIVERTICULITIS: Primary | ICD-10-CM

## 2018-07-12 RX ORDER — PROMETHAZINE HYDROCHLORIDE 25 MG/ML
25 INJECTION, SOLUTION INTRAMUSCULAR; INTRAVENOUS ONCE
Qty: 1 VIAL | Refills: 0
Start: 2018-07-12 | End: 2018-07-12

## 2018-07-12 RX ORDER — ONDANSETRON 4 MG/1
4 TABLET, ORALLY DISINTEGRATING ORAL
Qty: 30 TAB | Refills: 0 | Status: SHIPPED | OUTPATIENT
Start: 2018-07-12 | End: 2020-07-01

## 2018-07-12 RX ORDER — METRONIDAZOLE 500 MG/1
500 TABLET ORAL 3 TIMES DAILY
Qty: 30 TAB | Refills: 0 | Status: SHIPPED | OUTPATIENT
Start: 2018-07-12 | End: 2018-07-22

## 2018-07-12 RX ORDER — CIPROFLOXACIN 500 MG/1
500 TABLET ORAL 2 TIMES DAILY
Qty: 20 TAB | Refills: 0 | Status: SHIPPED | OUTPATIENT
Start: 2018-07-12 | End: 2018-07-22

## 2018-07-12 NOTE — PATIENT INSTRUCTIONS
Diverticulitis: Care Instructions Your Care Instructions Diverticulitis occurs when pouches form in the wall of the colon and become inflamed or infected. It can be very painful. Doctors aren't sure what causes diverticulitis. There is no proof that foods such as nuts, seeds, or berries cause it or make it worse. A low-fiber diet may cause the colon to work harder to push stool forward. Pouches may form because of this extra work. It may be hard to think about healthy eating while you're in pain. But as you recover, you might think about how you can use healthy eating for overall better health. Healthy eating may help you avoid future attacks. Follow-up care is a key part of your treatment and safety. Be sure to make and go to all appointments, and call your doctor if you are having problems. It's also a good idea to know your test results and keep a list of the medicines you take. How can you care for yourself at home? · Drink plenty of fluids, enough so that your urine is light yellow or clear like water. If you have kidney, heart, or liver disease and have to limit fluids, talk with your doctor before you increase the amount of fluids you drink. · Stick to liquids or a bland diet (plain rice, bananas, dry toast or crackers, applesauce) until you are feeling better. Then you can return to regular foods and gradually increase the amount of fiber in your diet. · Use a heating pad set on low on your belly to relieve mild cramps and pain. · Get extra rest until you are feeling better. · Be safe with medicines. Read and follow all instructions on the label. ¨ If the doctor gave you a prescription medicine for pain, take it as prescribed. ¨ If you are not taking a prescription pain medicine, ask your doctor if you can take an over-the-counter medicine. · If your doctor prescribed antibiotics, take them as directed. Do not stop taking them just because you feel better.  You need to take the full course of antibiotics. To prevent future attacks of diverticulitis · Avoid constipation: 
¨ Include fruits, vegetables, beans, and whole grains in your diet each day. These foods are high in fiber. ¨ Drink plenty of fluids, enough so that your urine is light yellow or clear like water. If you have kidney, heart, or liver disease and have to limit fluids, talk with your doctor before you increase the amount of fluids you drink. ¨ Get some exercise every day. Build up slowly to 30 to 60 minutes a day on 5 or more days of the week. ¨ Take a fiber supplement, such as Citrucel or Metamucil, every day if needed. Read and follow all instructions on the label. ¨ Schedule time each day for a bowel movement. Having a daily routine may help. Take your time and do not strain when having a bowel movement. When should you call for help? Call your doctor now or seek immediate medical care if: 
  · You have a fever.  
  · You are vomiting.  
  · You have new or worse belly pain.  
  · You cannot pass stools or gas.  
 Watch closely for changes in your health, and be sure to contact your doctor if you have any problems. Where can you learn more? Go to http://tin-tabby.info/. Enter H901 in the search box to learn more about \"Diverticulitis: Care Instructions. \" Current as of: May 12, 2017 Content Version: 11.7 © 5747-5641 [employer]. Care instructions adapted under license by [employer] (which disclaims liability or warranty for this information). If you have questions about a medical condition or this instruction, always ask your healthcare professional. [place] [address] any warranty or liability for your use of this information.

## 2018-07-12 NOTE — MR AVS SNAPSHOT
[facility] 
 
 
 [address] [place] [address] 
[phone] Patient: Cathy Schaefer MRN: [id number] Visit Information Date & Time Provider Department Dept. Phone Encounter #  
 2018 11:00 AM Nathen Woodward [facility] [phone] [id number] Upcoming Health Maintenance Date Due Influenza Age 5 to Adult 2018 BREAST CANCER SCRN MAMMOGRAM 2018 COLONOSCOPY 2020 DTaP/Tdap/Td series (2 - Td) 10/27/2025 Allergies as of 2018  Review Complete On: 2018 By: Vilma Elise MD  
  
 Severity Noted Reaction Type Reactions Morphine High 2010   Side Effect Anaphylaxis Current Immunizations  Reviewed on 10/6/2017 Name Date H1N1 FLU VACCINE 2010 Influenza Vaccine 10/9/2013 Influenza Vaccine Arletha Senait) 2016, 2014 Influenza Vaccine (Quad) PF 10/6/2017, 10/27/2015 Influenza Vaccine Split 10/3/2012, 10/25/2011 Tdap 10/27/2015 ZZZ-RETIRED (DO NOT USE) Pneumococcal Vaccine (Unspecified Type) 10/3/2012 Not reviewed this visit You Were Diagnosed With   
  
 Codes Comments Diverticulitis    -  Primary ICD-10-CM: K57.92 
ICD-9-CM: 562.11 Vitals BP Pulse Temp Resp Height(growth percentile) Weight(growth percentile) 124/78 (BP 1 Location: Left arm, BP Patient Position: Sitting) 74 99.4 °F (37.4 °C) (Oral) 17 5' 7\" (1.702 m) 182 lb (82.6 kg) SpO2 BMI OB Status Smoking Status 96% 28.51 kg/m2 Hysterectomy Never Smoker BMI and BSA Data Body Mass Index Body Surface Area 28.51 kg/m 2 1.98 m 2 Preferred Pharmacy Pharmacy Name Phone [id number] [phone] [facility] [phone] Your Updated Medication List  
  
   
This list is accurate as of 18 12:00 PM.  Always use your most recent med list.  
  
  
  
  
 azaTHIOprine 50 mg tablet Commonly known as:  The Pepsi Take 50 mg by mouth daily. cetirizine 10 mg tablet Commonly known as:  ZYRTEC Take 1 Tab by mouth daily. ciprofloxacin HCl 500 mg tablet Commonly known as:  CIPRO Take 1 Tab by mouth two (2) times a day for 10 days. cyanocobalamin 1,000 mcg/mL injection Commonly known as:  VITAMIN B12  
1,000 mcg by IntraMUSCular route every fourteen (14) days. cycloSPORINE 100 mg capsule Commonly known as:  SandIMMUNE Take 75 mg by mouth two (2) times a day.  
  
 gabapentin 800 mg tablet Commonly known as:  NEURONTIN Take 800 mg by mouth two (2) times a day. metroNIDAZOLE 500 mg tablet Commonly known as:  FLAGYL Take 1 Tab by mouth three (3) times daily for 10 days. MIRAPEX 0.125 mg tablet Generic drug:  pramipexole Take 0.125 mg by mouth nightly. ondansetron 4 mg disintegrating tablet Commonly known as:  ZOFRAN ODT Take 1 Tab by mouth every eight (8) hours as needed for Nausea. pravastatin 40 mg tablet Commonly known as:  PRAVACHOL Take 1 Tab by mouth nightly. predniSONE 5 mg tablet Commonly known as:  Mac Nguyen Take 2.5 mg by mouth daily. promethazine 25 mg/mL injection Commonly known as:  PHENERGAN  
1 mL by IntraMUSCular route once for 1 dose. VITAMIN D2 50,000 unit capsule Generic drug:  ergocalciferol Take 50,000 Units by mouth every seven (7) days. ZANTAC PO Take 150 mg by mouth daily as needed. Prescriptions Sent to Pharmacy Refills  
 ciprofloxacin HCl (CIPRO) 500 mg tablet 0 Sig: Take 1 Tab by mouth two (2) times a day for 10 days. Class: Normal  
 Pharmacy: [address] Ph #: [phone] Route: Oral  
 metroNIDAZOLE (FLAGYL) 500 mg tablet 0 Sig: Take 1 Tab by mouth three (3) times daily for 10 days.   
 Class: Normal  
 Pharmacy: [facility] #[id number] - [address], [address] Ph #: [phone] Route: Oral  
 ondansetron (ZOFRAN ODT) 4 mg disintegrating tablet 0 Sig: Take 1 Tab by mouth every eight (8) hours as needed for Nausea. Class: Normal  
 Pharmacy: [facility], [address] Ph #: [phone] Route: Oral  
  
We Performed the Following [facility] THER/PROPH/DIAG INJECTION, SUBCUT/IM [id number] CPT(R)] PROMETHAZINE HCL INJECTION [ [facility]] Patient Instructions Diverticulitis: Care Instructions Your Care Instructions Diverticulitis occurs when pouches form in the wall of the colon and become inflamed or infected. It can be very painful. Doctors aren't sure what causes diverticulitis. There is no proof that foods such as nuts, seeds, or berries cause it or make it worse. A low-fiber diet may cause the colon to work harder to push stool forward. Pouches may form because of this extra work. It may be hard to think about healthy eating while you're in pain. But as you recover, you might think about how you can use healthy eating for overall better health. Healthy eating may help you avoid future attacks. Follow-up care is a key part of your treatment and safety. Be sure to make and go to all appointments, and call your doctor if you are having problems. It's also a good idea to know your test results and keep a list of the medicines you take. How can you care for yourself at home? · Drink plenty of fluids, enough so that your urine is light yellow or clear like water. If you have kidney, heart, or liver disease and have to limit fluids, talk with your doctor before you increase the amount of fluids you drink. · Stick to liquids or a bland diet (plain rice, bananas, dry toast or crackers, applesauce) until you are feeling better.  Then you can return to regular foods and gradually increase the amount of fiber in your diet. · Use a heating pad set on low on your belly to relieve mild cramps and pain. · Get extra rest until you are feeling better. · Be safe with medicines. Read and follow all instructions on the label. ¨ If the doctor gave you a prescription medicine for pain, take it as prescribed. ¨ If you are not taking a prescription pain medicine, ask your doctor if you can take an over-the-counter medicine. · If your doctor prescribed antibiotics, take them as directed. Do not stop taking them just because you feel better. You need to take the full course of antibiotics. To prevent future attacks of diverticulitis · Avoid constipation: 
¨ Include fruits, vegetables, beans, and whole grains in your diet each day. These foods are high in fiber. ¨ Drink plenty of fluids, enough so that your urine is light yellow or clear like water. If you have kidney, heart, or liver disease and have to limit fluids, talk with your doctor before you increase the amount of fluids you drink. ¨ Get some exercise every day. Build up slowly to 30 to 60 minutes a day on 5 or more days of the week. ¨ Take a fiber supplement, such as Citrucel or Metamucil, every day if needed. Read and follow all instructions on the label. ¨ Schedule time each day for a bowel movement. Having a daily routine may help. Take your time and do not strain when having a bowel movement. When should you call for help? Call your doctor now or seek immediate medical care if: 
  · You have a fever.  
  · You are vomiting.  
  · You have new or worse belly pain.  
  · You cannot pass stools or gas.  
 Watch closely for changes in your health, and be sure to contact your doctor if you have any problems. Where can you learn more? Go to http://tin-tabby.info/. Enter H901 in the search box to learn more about \"Diverticulitis: Care Instructions. \" Current as of: May 12, 2017 Content Version: 11.7 © 4528-1696 [employer]. Care instructions adapted under license by [employer] (which disclaims liability or warranty for this information). If you have questions about a medical condition or this instruction, always ask your healthcare professional. [address] any warranty or liability for your use of this information. Introducing [facility]! [employer] introduces [employer] patient portal. Now you can access parts of your medical record, email your doctor's office, and request medication refills online. 1. In your internet browser, go to [email or web address] 2. Click on the First Time User? Click Here link in the Sign In box. You will see the New Member Sign Up page. 3. Enter your [employer] Access Code exactly as it appears below. You will not need to use this code after youve completed the sign-up process. If you do not sign up before the expiration date, you must request a new code. · [employer] Access Code: [email or web address] Expires: 10/10/2018 12:00 PM 
 
4. Enter the last four digits of your Social Security Number (xxxx) and Date of Birth (mm/dd/yyyy) as indicated and click Submit. You will be taken to the next sign-up page. 5. Create a [employer] ID. This will be your [employer] login ID and cannot be changed, so think of one that is secure and easy to remember. 6. Create a [employer] password. You can change your password at any time. 7. Enter your Password Reset Question and Answer. This can be used at a later time if you forget your password. 8. Enter your e-mail address. You will receive e-mail notification when new information is available in [address] 9. Click Sign Up. You can now view and download portions of your medical record. 10. Click the Download Summary menu link to download a portable copy of your medical information. If you have questions, please visit the Frequently Asked Questions section of the [employer] website. Remember, [employer] is NOT to be used for urgent needs. For medical emergencies, dial 911. Now available from your iPhone and Android! Please provide this summary of care documentation to your next provider. Your primary care clinician is listed as Barry Cunha. If you have any questions after today's visit, please call [phone].

## 2018-07-12 NOTE — PROGRESS NOTES
Chief Complaint   Patient presents with    Fever    Abdominal Pain     Health Maintenance reviewed

## 2018-07-17 ENCOUNTER — TELEPHONE (OUTPATIENT)
Dept: FAMILY MEDICINE CLINIC | Age: 58
End: 2018-07-17

## 2018-07-17 RX ORDER — METHYLPREDNISOLONE 4 MG/1
TABLET ORAL
Qty: 1 DOSE PACK | Refills: 0 | Status: SHIPPED | OUTPATIENT
Start: 2018-07-17 | End: 2018-11-16

## 2018-07-18 NOTE — PROGRESS NOTES
Subjective:   Cuong Haney is a 66 y.o. female brought by . She is a kidney transplant recipient and chronically immunosuppressed. She has a history of divericulitis last year. She states she's having similar symptoms over the past 24 hours - diarrhea, LLQ abdominal pain, subjective fever. Nausea, no vomiting. No blood in diarrhea. Past Medical History:   Diagnosis Date    Basal cell carcinoma in situ of skin     removed from left cheek and left arm    Hip pain     dr. Casa Cantor Hyperlipidemia     Kidney replaced by transplant 1990    unknown etiology, Dr. Pineda Chamber Peripheral neuropathy        Outpatient Prescriptions Marked as Taking for the 18 encounter (Office Visit) with Yo Burton MD   Medication Sig Dispense Refill    ciprofloxacin HCl (CIPRO) 500 mg tablet Take 1 Tab by mouth two (2) times a day for 10 days. 20 Tab 0    metroNIDAZOLE (FLAGYL) 500 mg tablet Take 1 Tab by mouth three (3) times daily for 10 days. 30 Tab 0    [] promethazine (PHENERGAN) 25 mg/mL injection 1 mL by IntraMUSCular route once for 1 dose. 1 Vial 0    ondansetron (ZOFRAN ODT) 4 mg disintegrating tablet Take 1 Tab by mouth every eight (8) hours as needed for Nausea. 30 Tab 0    pravastatin (PRAVACHOL) 40 mg tablet Take 1 Tab by mouth nightly. 90 Tab 3    cetirizine (ZYRTEC) 10 mg tablet Take 1 Tab by mouth daily. 30 Tab 0    azaTHIOprine (IMURAN) 50 mg tablet Take 50 mg by mouth daily.  ergocalciferol (VITAMIN D2) 50,000 unit capsule Take 50,000 Units by mouth every seven (7) days.  RANITIDINE HCL (ZANTAC PO) Take 150 mg by mouth daily as needed.  cyclosporine (SANDIMMUNE) 100 mg capsule Take 75 mg by mouth two (2) times a day.  predniSONE (DELTASONE) 5 mg tablet Take 2.5 mg by mouth daily.  pramipexole (MIRAPEX) 0.125 mg tablet Take 0.125 mg by mouth nightly.  gabapentin (NEURONTIN) 800 mg tablet Take 800 mg by mouth two (2) times a day.       cyanocobalamin (VITAMIN B12) 1,000 mcg/mL injection 1,000 mcg by IntraMUSCular route every fourteen (14) days. Allergies   Allergen Reactions    Morphine Anaphylaxis       Social History     Social History Narrative    . Grandchildren live with her. Pertinent ROS is in HPI. Objective:     Visit Vitals    /78 (BP 1 Location: Left arm, BP Patient Position: Sitting)    Pulse 74    Temp 99.4 °F (37.4 °C) (Oral)    Resp 17    Ht 5' 7\" (1.702 m)    Wt 182 lb (82.6 kg)    SpO2 96%    BMI 28.51 kg/m2     Gen: alert, oriented, ill appearing  Eyes: pupils equal round reactive to light, sclera clear, conjunctiva clear  Oral: moist mucus membranes, no oral lesions, mild pharyngeal erythema, no exudate  Resp: no increase work of breathing, lungs clear to ausculation bilaterally, no wheezing, rales or rhonchi  CV: S1, S2 normal, no murmurs, rubs, or gallops. Abd: soft, LLQ tender, not distended, no rebound. Normal bowel sounds. Neuro: cranial nerves intact, normal strength and movement in all extremities, reflexes age appropriate  Extremities: no cyanosis      Assessment/Plan:     1. Diverticulitis  Try PO antibiotics, cautioned patient if she is unable to keep medication down, she will need to go to ER for IV therapy. Phenergan IM today. - PROMETHAZINE HCL INJECTION  - HI THER/PROPH/DIAG INJECTION, SUBCUT/IM      Orders Placed This Encounter    PROMETHAZINE HCL INJECTION     Order Specific Question:   Dose     Answer:   25mg     Order Specific Question:   Site     Answer:   LEFT GLUTEUS     Order Specific Question:   Expiration Date     Answer:   6/1/1919     Order Specific Question:   Lot#     Answer:   [id number]     Order Specific Question:        Answer:   [place]     Order Specific Question:   Charge Quantity? Answer:   1     Order Specific Question:   Perfomed by/Witnessed by:      Answer:   Meredith Sanchez RN     Order Specific Question:   NDC#     Answer: 9570-6238-38    [place] THER/PROPH/DIAG INJECTION, SUBCUT/IM    ciprofloxacin HCl (CIPRO) 500 mg tablet     Sig: Take 1 Tab by mouth two (2) times a day for 10 days. Dispense:  20 Tab     Refill:  0    metroNIDAZOLE (FLAGYL) 500 mg tablet     Sig: Take 1 Tab by mouth three (3) times daily for 10 days. Dispense:  30 Tab     Refill:  0    promethazine (PHENERGAN) 25 mg/mL injection     Si mL by IntraMUSCular route once for 1 dose. Dispense:  1 Vial     Refill:  0    ondansetron (ZOFRAN ODT) 4 mg disintegrating tablet     Sig: Take 1 Tab by mouth every eight (8) hours as needed for Nausea. Dispense:  30 Tab     Refill:  0       Verbal and written instructions (see AVS) provided. Patient expresses understanding of diagnosis and treatment plan.

## 2018-07-25 ENCOUNTER — TELEPHONE (OUTPATIENT)
Dept: FAMILY MEDICINE CLINIC | Age: 58
End: 2018-07-25

## 2018-07-25 DIAGNOSIS — K57.92 DIVERTICULITIS: Primary | ICD-10-CM

## 2018-07-25 NOTE — TELEPHONE ENCOUNTER
Called pt. She started having pain in her lower abdomen similar to what she had when she saw Dr. Cynthia Jaimes last week, just not as intense. She completed her course of antibiotics on Sunday. Stated that Tylenol is helping some with the pain. She's having diarrhea, slight nausea, low grade fever (99). Wanted to know if she needs another round of antibiotics. She is fearful her symptoms will get like they were when she saw Dr. Cynthia Jaimes. She does not see a GI MD. She is aware that Dr. Cynthia Jaimes is not in today. Told her that I will discuss with Dr. Cynthia Jaimes tomorrow, but if symptoms continue or get worse to go the the ER. She verbalized understanding.

## 2018-07-25 NOTE — TELEPHONE ENCOUNTER
Called and MsPito Jefferson Baxter said she got better and now she is having in the same pain and she said her temp is 99.1. She would like to start back on an antibiotic.

## 2018-07-26 RX ORDER — CIPROFLOXACIN 500 MG/1
500 TABLET ORAL 2 TIMES DAILY
Qty: 20 TAB | Refills: 0 | Status: SHIPPED | OUTPATIENT
Start: 2018-07-26 | End: 2018-08-05

## 2018-07-26 RX ORDER — METRONIDAZOLE 500 MG/1
500 TABLET ORAL 3 TIMES DAILY
Qty: 30 TAB | Refills: 0 | Status: SHIPPED | OUTPATIENT
Start: 2018-07-26 | End: 2018-08-05

## 2018-07-26 NOTE — TELEPHONE ENCOUNTER
Called pt and notified her that antibiotics were sent to pharmacy and that she needs to see GI. Offered Dr. Nikolay Xiong, but pt stated she will call Kidney Transplant to see who she's seen in the past and she will call and make the GI appt.

## 2018-07-26 NOTE — TELEPHONE ENCOUNTER
abx refill sent. Also put in referral for GI - I put manetas, but if she prefers another that's fine. This is too many diverticulitis episode in 1 year given her history of kidney transplant.

## 2018-09-25 RX ORDER — PRAVASTATIN SODIUM 20 MG/1
TABLET ORAL
Qty: 90 TAB | Refills: 3 | Status: SHIPPED | OUTPATIENT
Start: 2018-09-25 | End: 2019-10-03 | Stop reason: SDUPTHER

## 2018-11-16 ENCOUNTER — OFFICE VISIT (OUTPATIENT)
Dept: FAMILY MEDICINE CLINIC | Age: 58
End: 2018-11-16

## 2018-11-16 VITALS
RESPIRATION RATE: 18 BRPM | HEART RATE: 73 BPM | WEIGHT: 179 LBS | SYSTOLIC BLOOD PRESSURE: 121 MMHG | HEIGHT: 67 IN | DIASTOLIC BLOOD PRESSURE: 74 MMHG | TEMPERATURE: 98.2 F | OXYGEN SATURATION: 96 % | BODY MASS INDEX: 28.09 KG/M2

## 2018-11-16 DIAGNOSIS — R91.8 ABNORMAL CT SCAN OF LUNG: ICD-10-CM

## 2018-11-16 DIAGNOSIS — E78.5 HYPERLIPIDEMIA, UNSPECIFIED HYPERLIPIDEMIA TYPE: ICD-10-CM

## 2018-11-16 DIAGNOSIS — N25.81 HYPERPARATHYROIDISM, SECONDARY RENAL (HCC): ICD-10-CM

## 2018-11-16 DIAGNOSIS — D64.9 ANEMIA, UNSPECIFIED TYPE: ICD-10-CM

## 2018-11-16 DIAGNOSIS — Z23 ENCOUNTER FOR IMMUNIZATION: ICD-10-CM

## 2018-11-16 DIAGNOSIS — E53.8 B12 DEFICIENCY: ICD-10-CM

## 2018-11-16 DIAGNOSIS — Z00.00 GENERAL MEDICAL EXAM: Primary | ICD-10-CM

## 2018-11-16 DIAGNOSIS — L98.9 SKIN LESION: ICD-10-CM

## 2018-11-16 DIAGNOSIS — Z12.39 BREAST CANCER SCREENING: ICD-10-CM

## 2018-11-16 NOTE — PROGRESS NOTES
66 y.o.  female presents for a physical. 
 
Patient Active Problem List  
 Diagnosis  Hyperparathyroidism, secondary renal ([facility] [address] Ca=10.1 9/16. .6. Followed by nephrology. Last seen 2 weeks ago.  GERD (gastroesophageal reflux disease) Well controlled on zantac  Anemia - .stable last week at nephrology  Gout - no recent flare, last UA 8.5 at nephrology  Kidney replaced by transplant  
  unknown etiology, Dr. Wilfrid Hilliard is nephrologist q3 months. Most recently creatinine is stable around 2 - last time 1.65. Remained stable last week at nephrology  Hyperlipidemia - Takes medication at night as directed, no muscle aches. Tries to watch diet.  Peripheral neuropathy - using warm socks, taking B12 monthly - due for an injection today. Past Medical History:  
Diagnosis Date  Abnormal CT scan of lung 11/16/2018 Scarring per pulmonary, not malignant  Basal cell carcinoma in situ of skin   
 removed from left cheek and left arm  Hip pain   
 dr. Tad Bello  Hyperlipidemia  Kidney replaced by transplant 1990  
 unknown etiology, Dr. Lisa Rothman  Peripheral neuropathy Past Surgical History:  
Procedure Laterality Date  HX CHOLECYSTECTOMY  HX HYSTERECTOMY  HX PREMALIG/BENIGN SKIN LESION EXCISION  2017  HX TRANSPLANT  1990  
 kidney Family History Problem Relation Age of Onset  Cancer Mother   
     breast  
 SLE Mother  Breast Cancer Mother 30s  Emphysema Father  Breast Cancer Maternal Grandmother   
     late 62s Social History Tobacco Use  Smoking status: Never Smoker  Smokeless tobacco: Never Used Substance Use Topics  Alcohol use: No  
  Alcohol/week: 0.0 oz  Drug use: No  
 
 
Social History Social History Narrative . Grandchildren live with her. Health Maintenance Due Topic Date Due  Shingrix Vaccine Age 50> (1 of 2) 03/30/2012  Influenza Age 5 to Adult  08/01/2018  MEDICARE YEARLY EXAM  10/29/2018  BREAST CANCER SCRN MAMMOGRAM  11/23/2018 Outpatient Medications Marked as Taking for the 11/16/18 encounter (Office Visit) with Fernando Richardson MD  
Medication Sig Dispense Refill  varicella-zoster recombinant, PF, (SHINGRIX) 50 mcg/0.5 mL susr injection 0.5 mL by IntraMUSCular route once for 1 dose. 0.5 mL 0  
 pravastatin (PRAVACHOL) 20 mg tablet TAKE 1 TABLET NIGHTLY 90 Tab 3  
 ondansetron (ZOFRAN ODT) 4 mg disintegrating tablet Take 1 Tab by mouth every eight (8) hours as needed for Nausea. 30 Tab 0  pravastatin (PRAVACHOL) 40 mg tablet Take 1 Tab by mouth nightly. 90 Tab 3  cetirizine (ZYRTEC) 10 mg tablet Take 1 Tab by mouth daily. 30 Tab 0  
 azaTHIOprine (IMURAN) 50 mg tablet Take 50 mg by mouth daily.  ergocalciferol (VITAMIN D2) 50,000 unit capsule Take 50,000 Units by mouth every seven (7) days.  RANITIDINE HCL (ZANTAC PO) Take 150 mg by mouth daily as needed.  cyclosporine (SANDIMMUNE) 100 mg capsule Take 75 mg by mouth two (2) times a day.  predniSONE (DELTASONE) 5 mg tablet Take 2.5 mg by mouth daily.  pramipexole (MIRAPEX) 0.125 mg tablet Take 0.125 mg by mouth nightly.  gabapentin (NEURONTIN) 800 mg tablet Take 800 mg by mouth two (2) times a day.  cyanocobalamin (VITAMIN B12) 1,000 mcg/mL injection 1,000 mcg by IntraMUSCular route every fourteen (14) days. Allergies Allergen Reactions  Morphine Anaphylaxis Review of Systems: 
Gen: no fatigue, fever, chills, weight loss or weight gain Eyes: no excessive tearing, itching, or discharge Nose: no rhinorrhea, no sinus pain Mouth: no oral lesions, no sore throat Resp: no shortness of breath, no wheezing, no cough CV: no chest pain, no orthopnea, no paroxysmal nocturnal dyspnea, no lower extremity edema, no palpitations Abd: no nausea, no heartburn, no diarrhea, no constipation, no abdominal pain Neuro: no headaches, no syncope or presyncopal episodes Endo: no polyuria, no polydipsia Heme: no lymphadenopathy, no easy bruising or bleeding Visit Vitals /74 (BP 1 Location: Left arm, BP Patient Position: Sitting) Pulse 73 Temp 98.2 °F (36.8 °C) (Oral) Resp 18 Ht 5' 7\" (1.702 m) Wt 179 lb (81.2 kg) SpO2 96% BMI 28.04 kg/m² Gen: alert, oriented, no acute distress Ears: external auditory canals clear, TMs without erythema or effusion Eyes: pupils equal round reactive to light, sclera clear, conjunctiva clear Oral: moist mucus membranes, no oral lesions, no pharyngeal inflammation or exudate Neck: thyroid symmetric and not enlarged, no carotid bruits, no jugular vein distention Resp: no increase work of breathing, lungs clear to ausculation bilaterally, no wheezing, rales or rhonchi CV: S1, S2 normal. No murmurs, rubs, or gallops. Abd: soft, not tender, not distended. No hepatosplenomegaly. Normal bowel sounds. No hernias. Neuro: cranial nerves intact, normal strength and movement in all extremities, reflexes and sensation intact and symmetric. Skin: 1cm papular lesion on right hand Extremities: no cyanosis or edema Assessment/Plan: 1. General medical exam 
Age appropriate Health Maintenance activities reviewed with patient and updated. 2. Abnormal CT scan of lung Reassuring follow up with pulmonary 3. Skin lesion Has follow up scheduled with dermatology 4. Hyperparathyroidism, secondary renal ([facility] [address] Managed by nephrology 5. Anemia, unspecified type Managed by nephrology 6. Hyperlipidemia, unspecified hyperlipidemia type No changes in medications pending lab results. 7. Encounter for immunization 
- INFLUENZA VIRUS VAC QUAD,SPLIT,PRESV FREE SYRINGE IM 
- DE IMMUNIZ ADMIN,1 SINGLE/COMB VAC/TOXOID Health Maintenance reviewed - updated Orders Placed This Encounter  DE IMMUNIZ ADMIN,1 SINGLE/COMB VAC/TOXOID  
  Influenza virus vaccine (QUADRIVALENT PRES FREE SYRINGE) IM ([id number])  varicella-zoster recombinant, PF, (SHINGRIX) 50 mcg/0.5 mL susr injection Si.5 mL by IntraMUSCular route once for 1 dose. Dispense:  0.5 mL Refill:  0 Medications Discontinued During This Encounter Medication Reason  methylPREDNISolone (MEDROL DOSEPACK) 4 mg tablet Not A Current Medication Current Outpatient Medications Medication Sig Dispense Refill  varicella-zoster recombinant, PF, (SHINGRIX) 50 mcg/0.5 mL susr injection 0.5 mL by IntraMUSCular route once for 1 dose. 0.5 mL 0  
 pravastatin (PRAVACHOL) 20 mg tablet TAKE 1 TABLET NIGHTLY 90 Tab 3  
 ondansetron (ZOFRAN ODT) 4 mg disintegrating tablet Take 1 Tab by mouth every eight (8) hours as needed for Nausea. 30 Tab 0  pravastatin (PRAVACHOL) 40 mg tablet Take 1 Tab by mouth nightly. 90 Tab 3  cetirizine (ZYRTEC) 10 mg tablet Take 1 Tab by mouth daily. 30 Tab 0  
 azaTHIOprine (IMURAN) 50 mg tablet Take 50 mg by mouth daily.  ergocalciferol (VITAMIN D2) 50,000 unit capsule Take 50,000 Units by mouth every seven (7) days.  RANITIDINE HCL (ZANTAC PO) Take 150 mg by mouth daily as needed.  cyclosporine (SANDIMMUNE) 100 mg capsule Take 75 mg by mouth two (2) times a day.  predniSONE (DELTASONE) 5 mg tablet Take 2.5 mg by mouth daily.  pramipexole (MIRAPEX) 0.125 mg tablet Take 0.125 mg by mouth nightly.  gabapentin (NEURONTIN) 800 mg tablet Take 800 mg by mouth two (2) times a day.  cyanocobalamin (VITAMIN B12) 1,000 mcg/mL injection 1,000 mcg by IntraMUSCular route every fourteen (14) days. Recommended healthy diet low in carbohydrates, fats, sodium and cholesterol. Recommended regular cardiovascular exercise 3-6 times per week for 30-60 minutes daily. Verbal and written instructions (see AVS) provided. Patient expresses understanding of diagnosis and treatment plan.

## 2018-11-16 NOTE — PROGRESS NOTES
Chief Complaint Patient presents with  Physical  
 
Health Maintenance reviewed 1. Have you been to the ER, urgent care clinic since your last visit? Hospitalized since your last visit? Yes- [facility] [id number] for 8 days 2. Have you seen or consulted any other health care providers outside of the [address] [place] [address] since your last visit? Include any pap smears or colon screening. Yes, on file

## 2018-11-16 NOTE — PATIENT INSTRUCTIONS
Vaccine Information Statement Influenza (Flu) Vaccine (Inactivated or Recombinant): What you need to know Many Vaccine Information Statements are available in [place] and other languages. See www.immunize.org/vis Hojas de Información Sobre Vacunas están disponibles en Español y en muchos otros idiomas. Visite www.immunize.org/vis 1. Why get vaccinated? Influenza (flu) is a contagious disease that spreads around the [place] every year, usually between October and May. Flu is caused by influenza viruses, and is spread mainly by coughing, sneezing, and close contact. Anyone can get flu. Flu strikes suddenly and can last several days. Symptoms vary by age, but can include: 
 fever/chills  sore throat  muscle aches  fatigue  cough  headache  runny or stuffy nose Flu can also lead to pneumonia and blood infections, and cause diarrhea and seizures in children. If you have a medical condition, such as heart or lung disease, flu can make it worse. Flu is more dangerous for some people. Infants and young children, people 72years of age and older, pregnant women, and people with certain health conditions or a weakened immune system are at greatest risk. Each year thousands of people in the [facility] die from flu, and many more are hospitalized. Flu vaccine can: 
 keep you from getting flu, 
 make flu less severe if you do get it, and 
 keep you from spreading flu to your family and other people. 2. Inactivated and recombinant flu vaccines A dose of flu vaccine is recommended every flu season. Children 6 months through 6years of age may need two doses during the same flu season. Everyone else needs only one dose each flu season.   
 
 
Some inactivated flu vaccines contain a very small amount of a mercury-based preservative called thimerosal. Studies have not shown thimerosal in vaccines to be harmful, but flu vaccines that do not contain thimerosal are available. There is no live flu virus in flu shots. They cannot cause the flu. There are many flu viruses, and they are always changing. Each year a new flu vaccine is made to protect against three or four viruses that are likely to cause disease in the upcoming flu season. But even when the vaccine doesnt exactly match these viruses, it may still provide some protection Flu vaccine cannot prevent: 
 flu that is caused by a virus not covered by the vaccine, or 
 illnesses that look like flu but are not. It takes about 2 weeks for protection to develop after vaccination, and protection lasts through the flu season. 3. Some people should not get this vaccine Tell the person who is giving you the vaccine:  If you have any severe, life-threatening allergies. If you ever had a life-threatening allergic reaction after a dose of flu vaccine, or have a severe allergy to any part of this vaccine, you may be advised not to get vaccinated. Most, but not all, types of flu vaccine contain a small amount of egg protein.  If you ever had Guillain-Barré Syndrome (also called GBS). Some people with a history of GBS should not get this vaccine. This should be discussed with your doctor.  If you are not feeling well. It is usually okay to get flu vaccine when you have a mild illness, but you might be asked to come back when you feel better. 4. Risks of a vaccine reaction With any medicine, including vaccines, there is a chance of reactions. These are usually mild and go away on their own, but serious reactions are also possible. Most people who get a flu shot do not have any problems with it. Minor problems following a flu shot include:  
 soreness, redness, or swelling where the shot was given  hoarseness  sore, red or itchy eyes  cough  fever  aches  headache  itching  fatigue If these problems occur, they usually begin soon after the shot and last 1 or 2 days. More serious problems following a flu shot can include the following:  There may be a small increased risk of Guillain-Barré Syndrome (GBS) after inactivated flu vaccine. This risk has been estimated at 1 or 2 additional cases per million people vaccinated. This is much lower than the risk of severe complications from flu, which can be prevented by flu vaccine.  Young children who get the flu shot along with pneumococcal vaccine (PCV13) and/or DTaP vaccine at the same time might be slightly more likely to have a seizure caused by fever. Ask your doctor for more information. Tell your doctor if a child who is getting flu vaccine has ever had a seizure. Problems that could happen after any injected vaccine:  People sometimes faint after a medical procedure, including vaccination. Sitting or lying down for about 15 minutes can help prevent fainting, and injuries caused by a fall. Tell your doctor if you feel dizzy, or have vision changes or ringing in the ears.  Some people get severe pain in the shoulder and have difficulty moving the arm where a shot was given. This happens very rarely.  Any medication can cause a severe allergic reaction. Such reactions from a vaccine are very rare, estimated at about 1 in a million doses, and would happen within a few minutes to a few hours after the vaccination. As with any medicine, there is a very remote chance of a vaccine causing a serious injury or death. The safety of vaccines is always being monitored. For more information, visit: www.cdc.gov/vaccinesafety/ 
 
5. What if there is a serious reaction? What should I look for?  Look for anything that concerns you, such as signs of a severe allergic reaction, very high fever, or unusual behavior.  
 
Signs of a severe allergic reaction can include hives, swelling of the face and throat, difficulty breathing, a fast heartbeat, dizziness, and weakness  usually within a few minutes to a few hours after the vaccination. What should I do?  If you think it is a severe allergic reaction or other emergency that cant wait, call 9-1-1 and get the person to the nearest hospital. Otherwise, call your doctor.  Reactions should be reported to the Vaccine Adverse Event Reporting System (VAERS). Your doctor should file this report, or you can do it yourself through  the VAERS web site at www.vaers. [facility].gov, or by calling 5-766.606.2745. VAERS does not give medical advice. 6. The National Vaccine Injury Compensation Program 
 
The [facility] Vaccine Injury Compensation Program (VICP) is a federal program that was created to compensate people who may have been injured by certain vaccines. Persons who believe they may have been injured by a vaccine can learn about the program and about filing a claim by calling 8-948.935.1109 or visiting the [facility] [employer] website at www.[facility].gov/vaccinecompensation. There is a time limit to file a claim for compensation. 7. How can I learn more?  Ask your healthcare provider. He or she can give you the vaccine package insert or suggest other sources of information.  Call your local or state health department.  Contact the Centers for Disease Control and Prevention (CDC): 
- Call 1-447.920.9493 (2-370-YYM-INFO) or 
- Visit CDCs website at www.cdc.gov/flu Vaccine Information Statement Inactivated Influenza Vaccine 8/7/2015 
[address] [id number]-50 Department of [facility] and [employer] Centers for Disease Control and Prevention Office Use Only

## 2018-11-17 LAB — VIT B12 SERPL-MCNC: 1383 PG/ML (ref 232–1245)

## 2018-11-30 ENCOUNTER — HOSPITAL ENCOUNTER (OUTPATIENT)
Dept: MAMMOGRAPHY | Age: 58
Discharge: HOME | End: 2018-11-30
Attending: INTERNAL MEDICINE
Payer: COMMERCIAL

## 2018-11-30 DIAGNOSIS — Z12.39 BREAST CANCER SCREENING: ICD-10-CM

## 2018-11-30 PROCEDURE — 77063 BREAST TOMOSYNTHESIS BI: CPT

## 2018-12-14 ENCOUNTER — HOSPITAL ENCOUNTER (OUTPATIENT)
Dept: PREADMISSION TESTING | Age: 58
Discharge: HOME | End: 2018-12-14
Attending: PLASTIC SURGERY
Payer: COMMERCIAL

## 2018-12-14 VITALS
TEMPERATURE: 98.5 F | BODY MASS INDEX: 28.44 KG/M2 | WEIGHT: 181.22 LBS | SYSTOLIC BLOOD PRESSURE: 139 MMHG | HEIGHT: 67 IN | DIASTOLIC BLOOD PRESSURE: 74 MMHG | HEART RATE: 63 BPM | OXYGEN SATURATION: 97 % | RESPIRATION RATE: 18 BRPM

## 2018-12-14 LAB
ANION GAP SERPL CALC-SCNC: 5 MMOL/L (ref 5–15)
ATRIAL RATE: 58 BPM
BUN SERPL-MCNC: 39 MG/DL (ref 6–20)
BUN/CREAT SERPL: 20 (ref 12–20)
CALCIUM SERPL-MCNC: 9.6 MG/DL (ref 8.5–10.1)
CALCULATED P AXIS, ECG09: 21 DEGREES
CALCULATED R AXIS, ECG10: -153 DEGREES
CALCULATED T AXIS, ECG11: 40 DEGREES
CHLORIDE SERPL-SCNC: 111 MMOL/L (ref 97–108)
CO2 SERPL-SCNC: 26 MMOL/L (ref 21–32)
CREAT SERPL-MCNC: 1.92 MG/DL (ref 0.55–1.02)
DIAGNOSIS, 93000: NORMAL
ERYTHROCYTE [DISTWIDTH] IN BLOOD BY AUTOMATED COUNT: 13.9 % (ref 11.5–14.5)
GLUCOSE SERPL-MCNC: 107 MG/DL (ref 65–100)
HCT VFR BLD AUTO: 33.7 % (ref 35–47)
HGB BLD-MCNC: 10.6 G/DL (ref 11.5–16)
MCH RBC QN AUTO: 28.3 PG (ref 26–34)
MCHC RBC AUTO-ENTMCNC: 31.5 G/DL (ref 30–36.5)
MCV RBC AUTO: 89.9 FL (ref 80–99)
NRBC # BLD: 0 K/UL (ref 0–0.01)
NRBC BLD-RTO: 0 PER 100 WBC
P-R INTERVAL, ECG05: 146 MS
PLATELET # BLD AUTO: 299 K/UL (ref 150–400)
PMV BLD AUTO: 10.4 FL (ref 8.9–12.9)
POTASSIUM SERPL-SCNC: 4.5 MMOL/L (ref 3.5–5.1)
Q-T INTERVAL, ECG07: 442 MS
QRS DURATION, ECG06: 84 MS
QTC CALCULATION (BEZET), ECG08: 433 MS
RBC # BLD AUTO: 3.75 M/UL (ref 3.8–5.2)
SODIUM SERPL-SCNC: 142 MMOL/L (ref 136–145)
VENTRICULAR RATE, ECG03: 58 BPM
WBC # BLD AUTO: 8 K/UL (ref 3.6–11)

## 2018-12-14 PROCEDURE — 85027 COMPLETE CBC AUTOMATED: CPT

## 2018-12-14 PROCEDURE — 36415 COLL VENOUS BLD VENIPUNCTURE: CPT

## 2018-12-14 PROCEDURE — 80048 BASIC METABOLIC PNL TOTAL CA: CPT

## 2018-12-14 PROCEDURE — 93005 ELECTROCARDIOGRAM TRACING: CPT

## 2018-12-14 NOTE — PERIOP NOTES
[facility]  Preoperative Instructions        Surgery Date 12/21/2018         Time of Arrival 6:00 AM    1. On the day of your surgery, please report to the Surgical Services Registration Desk and sign in at your designated time. The Surgery Center is located to the right of the Emergency Room. 2. You must have someone with you to drive you home. You should not drive a car for 24 hours following surgery. Please make arrangements for a friend or family member to stay with you for the first 24 hours after your surgery. 3. Do not have anything to eat or drink (including water, gum, mints, coffee, juice) after midnight 12/20/2018?? [place] ? This may not apply to medications prescribed by your physician. ?(Please note below the special instructions with medications to take the morning of your procedure.)    4. We recommend you do not drink any alcoholic beverages for 24 hours before and after your surgery. 5. Contact your surgeons office for instructions on the following medications: non-steroidal anti-inflammatory drugs (i.e. Advil, Aleve), vitamins, and supplements. (Some surgeons will want you to stop these medications prior to surgery and others may allow you to take them)  **If you are currently taking Plavix, Coumadin, Aspirin and/or other blood-thinning agents, contact your surgeon for instructions. ** Your surgeon will partner with the physician prescribing these medications to determine if it is safe to stop or if you need to continue taking. Please do not stop taking these medications without instructions from your surgeon    6. Wear comfortable clothes. Wear glasses instead of contacts. Do not bring any money or jewelry. Please bring picture ID, insurance card, and any prearranged co-payment or hospital payment. Do not wear make-up, particularly mascara the morning of your surgery. Do not wear nail polish, particularly if you are having foot /hand surgery.   Wear your hair loose or down, no ponytails, buns, alycia pins or clips. All body piercings must be removed. Please shower with antibacterial soap for three consecutive days before and on the morning of surgery, but do not apply any lotions, powders or deodorants after the shower on the day of surgery. Please use a fresh towels after each shower. Please sleep in clean clothes and change bed linens the night before surgery. Please do not shave for 48 hours prior to surgery. Shaving of the face is acceptable. 7. You should understand that if you do not follow these instructions your surgery may be cancelled. If your physical condition changes (I.e. fever, cold or flu) please contact your surgeon as soon as possible. 8. It is important that you be on time. If a situation occurs where you may be late, please call [phone] (OR Holding Area). 9. If you have any questions and or problems, please call [phone] (Pre-admission Testing). 10. Your surgery time may be subject to change. You will receive a phone call the evening prior if your time changes. 11.  If having outpatient surgery, you must have someone to drive you here, stay with you during the duration of your stay, and to drive you home at time of discharge. 12.   In an effort to improve the efficiency, privacy, and safety for all of our Pre-op patients visitors are not allowed in the Holding area. Once you arrive and are registered your family/visitors will be asked to remain in the waiting room. The Pre-op staff will get you from the Surgical Waiting Area and will explain to you and your family/visitors that the Pre-op phase is beginning. The staff will answer any questions and provide instructions for tracking of the patient, by use of the existing tracking number and color-coded status board in the waiting room.   At this time the staff will also ask for your designated spokesperson information in the event that the physician or staff need to provide an update or obtain any pertinent information. The designated spokesperson will be notified if the physician needs to speak to family during the pre-operative phase. If at any time your family/visitors has questions or concerns they may approach the volunteer desk in the waiting area for assistance. Special Instructions:None    MEDICATIONS TO TAKE THE MORNING OF SURGERY WITH A SIP OF WATER:Azathioprine, Cyclosporine, Gabapentin, Pramipexole, Prednisone, Ranitidine      I understand a pre-operative phone call will be made to verify my surgery time. In the event that I am not available, I give permission for a message to be left on my answering service and/or with another person?   Yes [phone]       ___________________      __________   _________    (Signature of Patient)             (Witness)                (Date and Time)

## 2018-12-17 NOTE — ADVANCED PRACTICE NURSE
EKG from 12/14/18 reviewed by Dr. Mark Newberry, anesthesiologist and compared with EKG/stress echo from 2011. Planned procedure reviewed. Ok to proceed with planned procedure.

## 2018-12-21 ENCOUNTER — ANESTHESIA (OUTPATIENT)
Dept: SURGERY | Age: 58
End: 2018-12-21
Payer: COMMERCIAL

## 2018-12-21 ENCOUNTER — ANESTHESIA EVENT (OUTPATIENT)
Dept: SURGERY | Age: 58
End: 2018-12-21
Payer: COMMERCIAL

## 2018-12-21 ENCOUNTER — HOSPITAL ENCOUNTER (OUTPATIENT)
Age: 58
Setting detail: OUTPATIENT SURGERY
Discharge: HOME | End: 2018-12-21
Attending: PLASTIC SURGERY | Admitting: PLASTIC SURGERY
Payer: COMMERCIAL

## 2018-12-21 VITALS
HEART RATE: 65 BPM | BODY MASS INDEX: 28.17 KG/M2 | WEIGHT: 179.45 LBS | SYSTOLIC BLOOD PRESSURE: 121 MMHG | TEMPERATURE: 97.9 F | RESPIRATION RATE: 16 BRPM | DIASTOLIC BLOOD PRESSURE: 57 MMHG | HEIGHT: 67 IN | OXYGEN SATURATION: 98 %

## 2018-12-21 DIAGNOSIS — C44.622 SQUAMOUS CELL CARCINOMA OF RIGHT UPPER EXTREMITY: Primary | ICD-10-CM

## 2018-12-21 PROCEDURE — 88331 PATH CONSLTJ SURG 1 BLK 1SPC: CPT

## 2018-12-21 PROCEDURE — 76210000016 HC OR PH I REC 1 TO 1.5 HR: Performed by: PLASTIC SURGERY

## 2018-12-21 PROCEDURE — 74011250636 HC RX REV CODE- 250/636: Performed by: ANESTHESIOLOGY

## 2018-12-21 PROCEDURE — 74011250636 HC RX REV CODE- 250/636

## 2018-12-21 PROCEDURE — 74011000250 HC RX REV CODE- 250: Performed by: PLASTIC SURGERY

## 2018-12-21 PROCEDURE — 77030020782 HC GWN BAIR PAWS FLX 3M -B

## 2018-12-21 PROCEDURE — 76060000032 HC ANESTHESIA 0.5 TO 1 HR: Performed by: PLASTIC SURGERY

## 2018-12-21 PROCEDURE — 77030032490 HC SLV COMPR SCD KNE COVD -B: Performed by: PLASTIC SURGERY

## 2018-12-21 PROCEDURE — 77030018836 HC SOL IRR NACL ICUM -A: Performed by: PLASTIC SURGERY

## 2018-12-21 PROCEDURE — 77030011640 HC PAD GRND REM COVD -A: Performed by: PLASTIC SURGERY

## 2018-12-21 PROCEDURE — 88305 TISSUE EXAM BY PATHOLOGIST: CPT

## 2018-12-21 PROCEDURE — 74011250637 HC RX REV CODE- 250/637: Performed by: PLASTIC SURGERY

## 2018-12-21 PROCEDURE — 76210000020 HC REC RM PH II FIRST 0.5 HR: Performed by: PLASTIC SURGERY

## 2018-12-21 PROCEDURE — 76010000138 HC OR TIME 0.5 TO 1 HR: Performed by: PLASTIC SURGERY

## 2018-12-21 RX ORDER — CEFAZOLIN SODIUM 1 G/3ML
INJECTION, POWDER, FOR SOLUTION INTRAMUSCULAR; INTRAVENOUS AS NEEDED
Status: DISCONTINUED | OUTPATIENT
Start: 2018-12-21 | End: 2018-12-21 | Stop reason: HOSPADM

## 2018-12-21 RX ORDER — PROPOFOL 10 MG/ML
INJECTION, EMULSION INTRAVENOUS
Status: DISCONTINUED | OUTPATIENT
Start: 2018-12-21 | End: 2018-12-21 | Stop reason: HOSPADM

## 2018-12-21 RX ORDER — HYDROCODONE BITARTRATE AND ACETAMINOPHEN 5; 325 MG/1; MG/1
1 TABLET ORAL ONCE
Status: COMPLETED | OUTPATIENT
Start: 2018-12-21 | End: 2018-12-21

## 2018-12-21 RX ORDER — ONDANSETRON 2 MG/ML
INJECTION INTRAMUSCULAR; INTRAVENOUS AS NEEDED
Status: DISCONTINUED | OUTPATIENT
Start: 2018-12-21 | End: 2018-12-21 | Stop reason: HOSPADM

## 2018-12-21 RX ORDER — MIDAZOLAM HYDROCHLORIDE 1 MG/ML
INJECTION, SOLUTION INTRAMUSCULAR; INTRAVENOUS AS NEEDED
Status: DISCONTINUED | OUTPATIENT
Start: 2018-12-21 | End: 2018-12-21 | Stop reason: HOSPADM

## 2018-12-21 RX ORDER — PHENYLEPHRINE HCL IN 0.9% NACL 0.4MG/10ML
SYRINGE (ML) INTRAVENOUS AS NEEDED
Status: DISCONTINUED | OUTPATIENT
Start: 2018-12-21 | End: 2018-12-21 | Stop reason: HOSPADM

## 2018-12-21 RX ORDER — SODIUM CHLORIDE, SODIUM LACTATE, POTASSIUM CHLORIDE, CALCIUM CHLORIDE 600; 310; 30; 20 MG/100ML; MG/100ML; MG/100ML; MG/100ML
25 INJECTION, SOLUTION INTRAVENOUS CONTINUOUS
Status: DISCONTINUED | OUTPATIENT
Start: 2018-12-21 | End: 2018-12-21 | Stop reason: HOSPADM

## 2018-12-21 RX ORDER — FENTANYL CITRATE 50 UG/ML
25 INJECTION, SOLUTION INTRAMUSCULAR; INTRAVENOUS
Status: DISCONTINUED | OUTPATIENT
Start: 2018-12-21 | End: 2018-12-21 | Stop reason: HOSPADM

## 2018-12-21 RX ORDER — SODIUM CHLORIDE 0.9 % (FLUSH) 0.9 %
5-10 SYRINGE (ML) INJECTION AS NEEDED
Status: DISCONTINUED | OUTPATIENT
Start: 2018-12-21 | End: 2018-12-21 | Stop reason: HOSPADM

## 2018-12-21 RX ORDER — DEXAMETHASONE SODIUM PHOSPHATE 4 MG/ML
INJECTION, SOLUTION INTRA-ARTICULAR; INTRALESIONAL; INTRAMUSCULAR; INTRAVENOUS; SOFT TISSUE AS NEEDED
Status: DISCONTINUED | OUTPATIENT
Start: 2018-12-21 | End: 2018-12-21 | Stop reason: HOSPADM

## 2018-12-21 RX ORDER — PROPOFOL 10 MG/ML
INJECTION, EMULSION INTRAVENOUS AS NEEDED
Status: DISCONTINUED | OUTPATIENT
Start: 2018-12-21 | End: 2018-12-21 | Stop reason: HOSPADM

## 2018-12-21 RX ORDER — DIPHENHYDRAMINE HYDROCHLORIDE 50 MG/ML
12.5 INJECTION, SOLUTION INTRAMUSCULAR; INTRAVENOUS AS NEEDED
Status: DISCONTINUED | OUTPATIENT
Start: 2018-12-21 | End: 2018-12-21 | Stop reason: HOSPADM

## 2018-12-21 RX ORDER — FENTANYL CITRATE 50 UG/ML
INJECTION, SOLUTION INTRAMUSCULAR; INTRAVENOUS AS NEEDED
Status: DISCONTINUED | OUTPATIENT
Start: 2018-12-21 | End: 2018-12-21 | Stop reason: HOSPADM

## 2018-12-21 RX ORDER — SODIUM CHLORIDE 0.9 % (FLUSH) 0.9 %
5-10 SYRINGE (ML) INJECTION EVERY 8 HOURS
Status: DISCONTINUED | OUTPATIENT
Start: 2018-12-21 | End: 2018-12-21 | Stop reason: HOSPADM

## 2018-12-21 RX ORDER — BACITRACIN ZINC 500 UNIT/G
OINTMENT (GRAM) TOPICAL ONCE
Status: COMPLETED | OUTPATIENT
Start: 2018-12-21 | End: 2018-12-21

## 2018-12-21 RX ORDER — LIDOCAINE HYDROCHLORIDE AND EPINEPHRINE 10; 10 MG/ML; UG/ML
20 INJECTION, SOLUTION INFILTRATION; PERINEURAL ONCE
Status: COMPLETED | OUTPATIENT
Start: 2018-12-21 | End: 2018-12-21

## 2018-12-21 RX ORDER — LIDOCAINE HYDROCHLORIDE 10 MG/ML
0.1 INJECTION, SOLUTION EPIDURAL; INFILTRATION; INTRACAUDAL; PERINEURAL AS NEEDED
Status: DISCONTINUED | OUTPATIENT
Start: 2018-12-21 | End: 2018-12-21 | Stop reason: HOSPADM

## 2018-12-21 RX ORDER — HYDROMORPHONE HYDROCHLORIDE 1 MG/ML
0.2 INJECTION, SOLUTION INTRAMUSCULAR; INTRAVENOUS; SUBCUTANEOUS
Status: DISCONTINUED | OUTPATIENT
Start: 2018-12-21 | End: 2018-12-21 | Stop reason: HOSPADM

## 2018-12-21 RX ORDER — HYDROCODONE BITARTRATE AND ACETAMINOPHEN 5; 325 MG/1; MG/1
1 TABLET ORAL
Qty: 20 TAB | Refills: 0 | Status: SHIPPED | OUTPATIENT
Start: 2018-12-21 | End: 2020-07-01

## 2018-12-21 RX ORDER — ACETAMINOPHEN 10 MG/ML
INJECTION, SOLUTION INTRAVENOUS AS NEEDED
Status: DISCONTINUED | OUTPATIENT
Start: 2018-12-21 | End: 2018-12-21 | Stop reason: HOSPADM

## 2018-12-21 RX ADMIN — PROPOFOL 40 MG: 10 INJECTION, EMULSION INTRAVENOUS at 07:44

## 2018-12-21 RX ADMIN — PROPOFOL 75 MCG/KG/MIN: 10 INJECTION, EMULSION INTRAVENOUS at 07:44

## 2018-12-21 RX ADMIN — Medication 40 MCG: at 08:30

## 2018-12-21 RX ADMIN — PROPOFOL 20 MG: 10 INJECTION, EMULSION INTRAVENOUS at 08:09

## 2018-12-21 RX ADMIN — Medication 40 MCG: at 08:16

## 2018-12-21 RX ADMIN — FENTANYL CITRATE 25 MCG: 50 INJECTION, SOLUTION INTRAMUSCULAR; INTRAVENOUS at 08:07

## 2018-12-21 RX ADMIN — ACETAMINOPHEN 1000 MG: 10 INJECTION, SOLUTION INTRAVENOUS at 07:45

## 2018-12-21 RX ADMIN — PROPOFOL 20 MG: 10 INJECTION, EMULSION INTRAVENOUS at 08:26

## 2018-12-21 RX ADMIN — PROPOFOL 20 MG: 10 INJECTION, EMULSION INTRAVENOUS at 07:56

## 2018-12-21 RX ADMIN — SODIUM CHLORIDE, SODIUM LACTATE, POTASSIUM CHLORIDE, AND CALCIUM CHLORIDE 25 ML/HR: 600; 310; 30; 20 INJECTION, SOLUTION INTRAVENOUS at 07:03

## 2018-12-21 RX ADMIN — ONDANSETRON 4 MG: 2 INJECTION INTRAMUSCULAR; INTRAVENOUS at 08:32

## 2018-12-21 RX ADMIN — PROPOFOL 30 MG: 10 INJECTION, EMULSION INTRAVENOUS at 07:57

## 2018-12-21 RX ADMIN — PROPOFOL 20 MG: 10 INJECTION, EMULSION INTRAVENOUS at 08:03

## 2018-12-21 RX ADMIN — FENTANYL CITRATE 25 MCG: 50 INJECTION, SOLUTION INTRAMUSCULAR; INTRAVENOUS at 07:48

## 2018-12-21 RX ADMIN — Medication 40 MCG: at 08:21

## 2018-12-21 RX ADMIN — HYDROCODONE BITARTRATE AND ACETAMINOPHEN 1 TABLET: 5; 325 TABLET ORAL at 09:11

## 2018-12-21 RX ADMIN — MIDAZOLAM HYDROCHLORIDE 2 MG: 1 INJECTION, SOLUTION INTRAMUSCULAR; INTRAVENOUS at 07:40

## 2018-12-21 RX ADMIN — PROPOFOL 40 MG: 10 INJECTION, EMULSION INTRAVENOUS at 07:48

## 2018-12-21 RX ADMIN — PROPOFOL 30 MG: 10 INJECTION, EMULSION INTRAVENOUS at 08:01

## 2018-12-21 RX ADMIN — DEXAMETHASONE SODIUM PHOSPHATE 4 MG: 4 INJECTION, SOLUTION INTRA-ARTICULAR; INTRALESIONAL; INTRAMUSCULAR; INTRAVENOUS; SOFT TISSUE at 07:58

## 2018-12-21 RX ADMIN — CEFAZOLIN SODIUM 2 G: 1 INJECTION, POWDER, FOR SOLUTION INTRAMUSCULAR; INTRAVENOUS at 07:51

## 2018-12-21 NOTE — DISCHARGE INSTRUCTIONS
May remove arm wrap on Sunday and shower. Leave yellow bolster dressing in place. May cover with clean dry dressing as needed. DISCHARGE SUMMARY from Nurse    PATIENT INSTRUCTIONS:    After general anesthesia or intravenous sedation, for 24 hours or while taking prescription Narcotics:  · Limit your activities  · Do not drive and operate hazardous machinery  · Do not make important personal or business decisions  · Do  not drink alcoholic beverages  · If you have not urinated within 8 hours after discharge, please contact your surgeon on call. Report the following to your surgeon:  · Excessive pain, swelling, redness or odor of or around the surgical area  · Temperature over 100.5  · Nausea and vomiting lasting longer than 4 hours or if unable to take medications  · Any signs of decreased circulation or nerve impairment to extremity: change in color, persistent  numbness, tingling, coldness or increase pain  · Any questions    What to do at Home:  A common side effect of anesthesia following surgery is nausea and/or vomiting. In order to decrease symptoms, it is wise to avoid foods that are high in fat, greasy foods, milk products, and spicy foods for the first 24 hours. Acceptable foods for the first 24 hours following surgery include but are not limited to:     soup   broth    toast    crackers    applesauce    bananas    mashed potatoes,   soft or scrambled eggs   oatmeal    jello    It is important to eat when taking your pain medication. This will help to prevent nausea. If possible, please try to time your meals with your medications. It is very important to stay hydrated following surgery. Sip fluids frequently while awake. Avoid acidic drinks such as citrus juices and soda for 24 hours. Carbonated beverages may cause bloating and gas.  Acceptable fluids include:    - water (flavor packets may add variety)  - coffee or tea (in moderation)  - Gatorade  - Lio-aid  - apple juice  - cranberry juice    You are encouraged to cough and deep breathe every hour when awake. This will help to prevent respiratory complications following anesthesia. You may want to hug a pillow when coughing and sneezing to add additional support to the surgical area and to decrease discomfort if you had abdominal or chest surgery. If you are discharged home with support stockings, you may remove them after 24 hours. Support stockings are used to help prevent blood clots in the legs following surgery. Please take time to review all of your Home Care Instructions and Medication Information sheets provided in your discharge packet. If you have any questions, please contact your surgeons office. Thank you. TO PREVENT AN INFECTION      1. [address] YOUR HANDS     To prevent infection, good handwashing is the most important thing you or your caregiver can do.  Wash your hands with soap and water or use the hand  we gave you before you touch any wounds. 2. SHOWER     Use the antibacterial soap we gave you when you take a shower.  Shower with this soap until your wounds are healed.  To reach all areas of your body, you may need someone to help you.  Dont forget to clean your belly button with every shower. 3.  USE CLEAN SHEETS     Use freshly cleaned sheets on your bed after surgery.  To keep the surgery site clean, do not allow pets to sleep with you while your wound is still healing. 4. STOP SMOKING     Stop smoking, or at least cut back on smoking     Smoking slows your healing. 5.  CONTROL YOUR BLOOD SUGAR     High blood sugars slow wound healing. If you are diabetic, control your blood sugar levels before and after your surgery. Please carry medication information at all times in case of emergency situations. Hydrocodone/Acetaminophen (Vicodin, Norco, Lortab) - (By mouth)   Why this medicine is used:   Treats pain.   Contact a nurse or doctor right away if you have:  · Blistering, peeling, red skin rash  · Fast or slow heartbeat, shallow breathing, blue lips, fingernails, or skin  · Anxiety, restlessness, muscle spasms, twitching, seeing or hearing things that are not there  · Dark urine or pale stools, yellow skin or eyes  · Extreme weakness, sweating, seizures, cold or clammy skin  · Lightheadedness, dizziness, fainting, fever, sweating     Common side effects:  · Constipation, nausea, vomiting, loss of appetite, stomach pain  · Tiredness or sleepiness  © 2017 [address] Information is for End User's use only and may not be sold, redistributed or otherwise used for commercial purposes. No smoking/ No tobacco products/ Avoid exposure to second hand smoke  Surgeon General's Warning:  Quitting smoking now greatly reduces serious risk to your health. Obesity, smoking, and sedentary lifestyle greatly increases your risk for illness    A healthy diet, regular physical exercise & weight monitoring are important for maintaining a healthy lifestyle    You may be retaining fluid if you have a history of heart failure or if you experience any of the following symptoms:  Weight gain of 3 pounds or more overnight or 5 pounds in a week, increased swelling in our hands or feet or shortness of breath while lying flat in bed. Please call your doctor as soon as you notice any of these symptoms; do not wait until your next office visit. Recognize signs and symptoms of STROKE:    F-face looks uneven    A-arms unable to move or move unevenly    S-speech slurred or non-existent    T-time-call 911 as soon as signs and symptoms begin-DO NOT go       Back to bed or wait to see if you get better-TIME IS BRAIN. Warning Signs of HEART ATTACK     Call 911 if you have these symptoms:   Chest discomfort. Most heart attacks involve discomfort in the center of the chest that lasts more than a few minutes, or that goes away and comes back.  It can feel like uncomfortable pressure, squeezing, fullness, or pain.  Discomfort in other areas of the upper body. Symptoms can include pain or discomfort in one or both arms, the back, neck, jaw, or stomach.  Shortness of breath with or without chest discomfort.  Other signs may include breaking out in a cold sweat, nausea, or lightheadedness. Don't wait more than five minutes to call 911 - MINUTES MATTER! Fast action can save your life. Calling 911 is almost always the fastest way to get lifesaving treatment. Emergency Medical Services staff can begin treatment when they arrive -- up to an hour sooner than if someone gets to the hospital by car. The discharge information has been reviewed with the {PATIENT PARENT GUARDIAN:68256}. The {PATIENT PARENT GUARDIAN:50583} verbalized understanding. Discharge medications reviewed with the {Dishcarge meds reviewed BU:22772} and appropriate educational materials and side effects teaching were provided.   ___________________________________________________________________________________________________________________________________

## 2018-12-21 NOTE — OP NOTES
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 PROCEDURES PERFORMED:  1. Excision right hand squamous carcinoma measuring 3.1 x 3.2 cm.  2.  Full thickness skin graft reconstruction of right hand defect measuring 3.1 x 3.2 cm. SURGEON:  Kari Morel MD    ASSISTANT:  Roger Shen.    ANESTHESIA:  Local with IV sedation. ESTIMATED BLOOD LOSS:  Less than 5 mL. SPECIMENS REMOVED:  Right hand squamous carcinoma. COMPLICATIONS:  None. IMPLANTS:  None. IMPRESSION:  This 61-year-old white female renal transplant patient with a history of multiple nonmelanoma skin cancers presented with biopsy proven squamous carcinoma of the right dorsal hand that required excision and reconstruction. PROCEDURE:  After informed consent was obtained, under IV sedation, right dorsal hand and right lower quadrant of the abdomen were infiltrated with buffered 1% lidocaine with epinephrine and then prepped and draped. Two grams of Ancef IV were given. The right hand lesion was excised with approximately 3-4 mm margins peripherally down to deep subcutaneous tissue. It was marked with a single stitch proximally and sent for frozen section, which revealed squamous cell carcinoma with free margins. Attention was then turned to the reconstruction. A full thickness skin graft was harvested as a transverse ellipse from the right lower quadrant of the abdomen. The donor site was closed in layers of absorbable suture and dressed with Dermabond. The graft was defatted, fenestrated, cut to the appropriate size, and secured to the wound bed with 5-0 plain gut followed by a tie-over Xeroform bolster, Kerlix, and an Ace wrap.   She tolerated the procedure well and was sent to recovery room in good condition.       MD JUSTINE Powers / KRISTINA  D: 12/21/2018 09:02     T: 12/21/2018 09:21  JOB #: [id number]  CC: Greg Calvillo MD  CC: Maura Watson MD

## 2018-12-21 NOTE — ANESTHESIA POSTPROCEDURE EVALUATION
Procedure(s):  EXCISION RIGHT HAND LESION WITH   SKIN GRAFT   FROMRIGHT HIP. Anesthesia Post Evaluation        Patient location during evaluation: PACU  Note status: Adequate. Level of consciousness: responsive to verbal stimuli and sleepy but conscious  Pain management: satisfactory to patient  Airway patency: patent  Anesthetic complications: no  Cardiovascular status: acceptable  Respiratory status: acceptable  Hydration status: acceptable  Comments: +Post-Anesthesia Evaluation and Assessment    Patient: Janay Topete MRN: [id number]  SSN: [id number]   YOB: 1960  Age: 66 y.o. Sex: female      Cardiovascular Function/Vital Signs    /54   Pulse 65   Temp 36.4 °C (97.6 °F)   Resp 12   Ht 5' 7\" (1.702 m)   Wt 81.4 kg (179 lb 7.3 oz)   SpO2 98%   BMI 28.11 kg/m²     Patient is status post Procedure(s):  EXCISION RIGHT HAND LESION WITH   SKIN GRAFT   FROMRIGHT HIP. Nausea/Vomiting: Controlled. Postoperative hydration reviewed and adequate. Pain:  Pain Scale 1: Numeric (0 - 10) (12/21/18 0945)  Pain Intensity 1: 4 (12/21/18 0945)   Managed. Neurological Status:   Neuro (WDL): Exceptions to WDL (12/21/18 0842)   At baseline. Mental Status and Level of Consciousness: Arousable. Pulmonary Status:   O2 Device: Room air (12/21/18 0855)   Adequate oxygenation and airway patent. Complications related to anesthesia: None    Post-anesthesia assessment completed. No concerns.     Signed By: Breezy Kraus MD    12/21/2018  Post anesthesia nausea and vomiting:  controlled      Visit Vitals  /54   Pulse 65   Temp 36.4 °C (97.6 °F)   Resp 12   Ht 5' 7\" (1.702 m)   Wt 81.4 kg (179 lb 7.3 oz)   SpO2 98%   BMI 28.11 kg/m²

## 2018-12-21 NOTE — ANESTHESIA PREPROCEDURE EVALUATION
Anesthetic History   No history of anesthetic complications            Review of Systems / Medical History  Patient summary reviewed, nursing notes reviewed and pertinent labs reviewed    Pulmonary  Within defined limits                 Neuro/Psych   Within defined limits           Cardiovascular  Within defined limits                Exercise tolerance: >4 METS     GI/Hepatic/Renal     GERD    Renal disease: CRI       Endo/Other        Arthritis     Other Findings   Comments: Right hand lesion, basal cell    S/P renal transplant     Had awareness during colonoscopy 2 yrs ago at [facility]         Physical Exam    Airway  Mallampati: II  TM Distance: 4 - 6 cm  Neck ROM: normal range of motion   Mouth opening: Normal     Cardiovascular  Regular rate and rhythm,  S1 and S2 normal,  no murmur, click, rub, or gallop             Dental    Dentition: Upper partial plate     Pulmonary  Breath sounds clear to auscultation               Abdominal  GI exam deferred       Other Findings            Anesthetic Plan    ASA: 2  Anesthesia type: general    Monitoring Plan: BIS      Induction: Intravenous  Anesthetic plan and risks discussed with: Patient

## 2018-12-21 NOTE — BRIEF OP NOTE
BRIEF OPERATIVE NOTE    Date of Procedure: 12/21/2018   Preoperative Diagnosis: RIGHT HAND SCC  Postoperative Diagnosis: RIGHT HAND SCC  Procedure(s):  EXCISION RIGHT HAND LESION WITH   SKIN GRAFT   FROMRIGHT HIP 3.1 x 3.2cm  Surgeon(s) and Role:     Mirna Rooney MD - Primary         Surgical Assistant: FERN    Surgical Staff:  Circ-1: Cristian Brar RN  Scrub Tech-1: Angel Barefoot  Surg Asst-1: Dwayne [facility]  Event Time In Time Out   Incision Start 0801    Incision Close 1632      Anesthesia: MAC   Estimated Blood Loss: 5ml  Specimens:   ID Type Source Tests Collected by Time Destination   1 : RIGHT HAND SQUAMOUS CELL CA, STITCH PROXIMAL Frozen , Right  Jame Diggs MD 12/21/2018 0804 Pathology      Findings: see note   Complications: none  Implants: * No implants in log *

## 2018-12-21 NOTE — ROUTINE PROCESS
Patient: Tracy Patient MRN: [id number]  SSN: [id number]   YOB: 1960  Age: 66 y.o. Sex: female     Patient is status post Procedure(s):  EXCISION RIGHT HAND LESION WITH   SKIN GRAFT   FROMRIGHT HIP. Surgeon(s) and Role:     * Nadiya Ingram MD - Primary    Local/Dose/Irrigation:  15 ML LOCAL INJECTION RIGHT HAND AND RIGHT HIP                  Peripheral IV 12/21/18 Left Hand (Active)   Site Assessment Clean, dry, & intact 12/21/2018  6:50 AM   Phlebitis Assessment 0 12/21/2018  6:50 AM   Infiltration Assessment 0 12/21/2018  6:50 AM   Dressing Status Clean, dry, & intact 12/21/2018  6:50 AM   Dressing Type Tape;Transparent 12/21/2018  6:50 AM   Hub Color/Line Status Pink; Infusing 12/21/2018  6:50 AM                           Dressing/Packing:  Wound Hand Right-DRESSING TYPE: 4 x 4;Elastic bandage;Gauze; Sutures; Xeroform; Other (Comment)(kerlix) (12/21/18 0937)  Wound Hip Right-DRESSING TYPE: Sutures; Topical skin adhesive/glue (12/21/18 5056)  Splint/Cast:  ]    Other:  NONE

## 2018-12-21 NOTE — PERIOP NOTES
Handoff Report from Operating Room to PACU    Report received from Kelsy Horne RN and Zeb Linda CRNA regarding Ku Collar. Surgeon(s):  Moises Haskins MD  And Procedure(s) (LRB):  EXCISION RIGHT HAND LESION WITH   SKIN GRAFT   FROMRIGHT HIP (Right)  confirmed   with dressings discussed. Anesthesia type, drugs, patient history, complications, estimated blood loss, vital signs, intake and output, and last pain medication, lines and temperature were reviewed.

## 2019-01-24 ENCOUNTER — OFFICE VISIT (OUTPATIENT)
Dept: FAMILY MEDICINE CLINIC | Age: 59
End: 2019-01-24

## 2019-01-24 VITALS
HEIGHT: 67 IN | BODY MASS INDEX: 28.56 KG/M2 | HEART RATE: 64 BPM | OXYGEN SATURATION: 98 % | DIASTOLIC BLOOD PRESSURE: 75 MMHG | RESPIRATION RATE: 16 BRPM | WEIGHT: 182 LBS | TEMPERATURE: 98.7 F | SYSTOLIC BLOOD PRESSURE: 116 MMHG

## 2019-01-24 DIAGNOSIS — M51.36 LUMBAR DEGENERATIVE DISC DISEASE: ICD-10-CM

## 2019-01-24 DIAGNOSIS — M54.50 CHRONIC LEFT-SIDED LOW BACK PAIN WITHOUT SCIATICA: Primary | ICD-10-CM

## 2019-01-24 DIAGNOSIS — G89.29 CHRONIC LEFT-SIDED LOW BACK PAIN WITHOUT SCIATICA: Primary | ICD-10-CM

## 2019-01-24 NOTE — PATIENT INSTRUCTIONS

## 2019-01-24 NOTE — PROGRESS NOTES
Subjective:     Chief Complaint   Patient presents with    Hip Pain     Left hip         HPI:  Andrew Turk is a 66 y.o. female  Here for complaints of left hip pain. Fell week before Johana, says that she was walking outside carrying a bunch of stuff and fell in the yard. Hurt left hand, Went to the ER and told she broke her ring finger and and bone in her hand below her ring finger and was in a cast, seen by Dr Ellis Block. Did not feel like she hurt anything else at that time. Her left hip has been bothering her for a long time but started to hurt worse in November, left hip pain did not worsen after falling. She has been told that she has arthritis in that hip. She says that her left hip has been bothering her more and more and now she cant stand more than 15 minutes before she needs to stretch or sit down. Sitting in hard chair with legs extended relieves the pain some. She is using some topical analgesics PRN. She has history of kidney transplant and cannot take NSAIDs  Has peripheral neuropathy, followed by Dr Quinton Jansen. Prescribed gabapentin 800mg TID but only takes BID  She says that she has a \"pain medicine at home from old surgery but I dont like to take it unless it is really bad\"    She was seen by Dr Maggy Ramires in 2012 for same complaints. Had Imaging done there and she said that she would like to see him again. No hospital, ER or specialist visits since last primary care visit except as noted above.     Past Medical History:   Diagnosis Date    Abnormal CT scan of lung 11/16/2018    Scarring per pulmonary, not malignant    Arthritis     Basal cell carcinoma in situ of skin     removed from left cheek and left arm    Chronic kidney disease 1990    Renal Transplant    Diverticulitis     Hip pain     dr. Jossie Martin    Hyperlipidemia     [facility]par[facility]roidism ([facility] [address]     Kidney replaced by transplant 1990    unknown etiology, Dr. Elicia Manning Peripheral neuropathy        Social History     Tobacco Use    Smoking status: Never Smoker    Smokeless tobacco: Never Used   Substance Use Topics    Alcohol use: No     Alcohol/week: 0.0 oz    Drug use: No       Outpatient Medications Marked as Taking for the 1/24/19 encounter (Office Visit) with Norman Zambrano NP   Medication Sig Dispense Refill    pravastatin (PRAVACHOL) 20 mg tablet TAKE 1 TABLET NIGHTLY 90 Tab 3    cetirizine (ZYRTEC) 10 mg tablet Take 1 Tab by mouth daily. 30 Tab 0    azaTHIOprine (IMURAN) 50 mg tablet Take 50 mg by mouth daily.  ergocalciferol (VITAMIN D2) 50,000 unit capsule Take 50,000 Units by mouth every seven (7) days.  RANITIDINE HCL (ZANTAC PO) Take 150 mg by mouth daily as needed.  cyclosporine (SANDIMMUNE) 100 mg capsule Take 75 mg by mouth two (2) times a day.  predniSONE (DELTASONE) 5 mg tablet Take 2.5 mg by mouth daily.  pramipexole (MIRAPEX) 0.125 mg tablet Take 0.125 mg by mouth nightly.  gabapentin (NEURONTIN) 800 mg tablet Take 800 mg by mouth two (2) times a day.  cyanocobalamin (VITAMIN B12) 1,000 mcg/mL injection 1,000 mcg by IntraMUSCular route every fourteen (14) days. Allergies   Allergen Reactions    Morphine Anaphylaxis       Health Maintenance reviewed       ROS:  Gen: no fatigue, no fever, no chills, no unexplained weight loss or weight gain  Eyes: no excessive tearing, itching, or discharge  Nose: no rhinorrhea, no sinus pain  Mouth: no oral lesions, no sore throat, no difficulty swallowing  Resp: no shortness of breath, no wheezing, no cough  CV: no chest pain, no orthopnea, no paroxysmal nocturnal dyspnea, no lower extremity edema, no palpitations  Abd: no nausea, no heartburn, no diarrhea, no constipation, no abdominal pain  Neuro: no headaches, no syncope or presyncopal episodes  Endo: no polyuria, no polydipsia.     : no hematuria, no dysuria, no frequency, no incontinence  Heme: no lymphadenopathy, no easy bruising or bleeding, no night sweats      PE:  Visit Vitals  /75 (BP 1 Location: Left arm, BP Patient Position: Sitting)   Pulse 64   Temp 98.7 °F (37.1 °C) (Oral)   Resp 16   Ht 5' 7\" (1.702 m)   Wt 182 lb (82.6 kg)   SpO2 98%   BMI 28.51 kg/m²     Gen: alert, oriented, no acute distress  Head: normocephalic, atraumatic  Eyes: pupils equal round reactive to light, sclera clear, conjunctiva clear  Oral: moist mucus membranes, no oral lesions, no pharyngeal inflammation or exudate  Neck: symmetric normal sized thyroid, no carotid bruits, no jugular vein distention  Resp: no increase work of breathing, lungs clear to ausculation bilaterally, no wheezing, rales or rhonchi  CV: S1, S2 normal.  No murmurs, rubs, or gallops. Abd: soft, not tender, not distended. No hepatosplenomegaly. Normal bowel sounds. No hernias. Neuro: cranial nerves intact, normal strength and movement in all extremities  Skin: no lesion or rash  Extremities: no cyanosis or edema  Back:  No spinal deformity. There is tenderness on palpation of the left lower lumbar area and paraspinal muscles and piriformis area. Assessment/Plan:  Differential diagnosis and treatment options reviewed with patient who is in agreement with treatment plan as outlined below. ICD-10-CM ICD-9-CM    1. Chronic left-sided low back pain without sciatica M54.5 724.2 REFERRAL TO ORTHOPEDICS    G89.29 338.29    2. Lumbar degenerative disc disease M51.36 722.52 REFERRAL TO ORTHOPEDICS     Increase gabapentin to TID to help with pain. May use topical analgesic as well. Stretches, handout given in AVS  Referral to Ortho, Dr Ynes Linda, called and made her an appointment with same ortho she was seen by previously for same complaint so he can do imaging and compare to old images and treat appropriately. Discussed BMI and healthy weight. Encouraged patient to work to implement changes including diet high in raw fruits and vegetables, lean protein and good fats.  Limit refined, processed carbohydrates and sugar. Encouraged regular exercise. Recommended regular cardiovascular exercise 3-6 times per week for 30-60 minutes daily. I have discussed the diagnosis with the patient and the intended plan as seen in the above orders. The patient has received an after-visit summary and questions were answered concerning future plans. I have discussed medication side effects and warnings with the patient as well. The patient verbalizes understanding and agreement with the plan.

## 2019-01-24 NOTE — PROGRESS NOTES
Chief Complaint   Patient presents with    Hip Pain     Left hip      1. Have you been to the ER, urgent care clinic since your last visit? Hospitalized since your last visit? Yes When: ER for fall and broke finger    2. Have you seen or consulted any other health care providers outside of the [address] [place] [address] since your last visit? Include any pap smears or colon screening. No      Pt reports that her pain started around bhavin, pt reports that stretching helps. Pt is 10/10 pain. Pt is only taking tylenol due to her kidney transplant.

## 2019-02-21 ENCOUNTER — TELEPHONE (OUTPATIENT)
Dept: FAMILY MEDICINE CLINIC | Age: 59
End: 2019-02-21

## 2019-02-21 NOTE — TELEPHONE ENCOUNTER
Pt is calling stating she started having pain in her sides yesterday and now she has a rash broke out over body and still in pain wants to speak with nurse

## 2019-02-21 NOTE — TELEPHONE ENCOUNTER
Set her up to be seen tomorrow.  She said she is having pain in her side and has a rash on the trunk of her body on both sides

## 2019-02-22 ENCOUNTER — OFFICE VISIT (OUTPATIENT)
Dept: FAMILY MEDICINE CLINIC | Age: 59
End: 2019-02-22

## 2019-02-22 VITALS
SYSTOLIC BLOOD PRESSURE: 122 MMHG | HEART RATE: 62 BPM | OXYGEN SATURATION: 97 % | RESPIRATION RATE: 12 BRPM | BODY MASS INDEX: 28.72 KG/M2 | TEMPERATURE: 98.1 F | DIASTOLIC BLOOD PRESSURE: 79 MMHG | WEIGHT: 183 LBS | HEIGHT: 67 IN

## 2019-02-22 DIAGNOSIS — B09 VIRAL RASH: Primary | ICD-10-CM

## 2019-02-22 RX ORDER — VALACYCLOVIR HYDROCHLORIDE 500 MG/1
500 TABLET, FILM COATED ORAL 3 TIMES DAILY
Qty: 42 TAB | Refills: 0 | Status: SHIPPED | OUTPATIENT
Start: 2019-02-22 | End: 2019-03-08

## 2019-02-22 NOTE — PROGRESS NOTES
Subjective:      Jermaine Soliman is a 66 y.o. female who presents for evaluation of rash. Rash started 2 days ago. Lesions are pink in color, are of blistering textrure, 0.1 by 0.1 in size. Initial distribution: abdomen, back, torso. Rash has changed over time. Discomfort associated with rash: painful, pruritic. Associated symptoms: other: burning. Denies: no associated symptoms. Patient has not had previous evaluation of rash. Patient has self treated with cortisone cream and heat which has caused as a response for the  rash to spread and intensify symptoms . Patient has had contacts with similar rash. Similar to shingles had twice in the 1990's post post kidney transplant. Patient has not identified precipitant. Patient has not had new exposures (soaps, lotions, laundry detergents, foods, medications, plants, insects or animals.)  Pertinent PMH Kidney transplant in 1990's and more decline in kidney function . Followed by Dr. Alexus Lay [facility] [place] [place] post transplant  Review of Systems  Pertinent items are noted in HPI. Objective:     Visit Vitals  /79 (BP 1 Location: Left arm, BP Patient Position: Sitting)   Pulse 62   Temp 98.1 °F (36.7 °C) (Oral)   Resp 12   Ht 5' 7\" (1.702 m)   Wt 183 lb (83 kg)   SpO2 97%   BMI 28.66 kg/m²     General:  alert, cooperative, mild distress, appears stated age   Primary findings: vesicles   Lesion Size: 0.1 x0.1 .    Color of lesions:  pink, erythematous   Blanching: yes   Configuration: dermatomal   Secondary Features:  no secondary findings   Distribution:  Under breast ,abdomen and back   Lymph Nodes:  no regional adenopathy     Assessment:   Differential   Atypical viral rash  Atypical Shingles due to immunosuppressed post kidney transplant        Plan:   Does adjusted due to kidney function   Patient agreed to notify transplant provider to be seen as soon as practical.   Orders Placed This Encounter    valACYclovir (VALTREX) 500 mg tablet     Sig: Take 1 Tab by mouth three (3) times daily for 14 days. Dispense:  42 Tab     Refill:  0    calamine (CALADRYL) topical lotion     Sig: Apply to affected tid prn     Dispense:  120 mL     Refill:  0     Observe for signs of superimposed infection and systemic symptoms  Reassurance was given to the patient. Referral to Dermatology if not improved  Contact kidney transplant provider at [facility] concerns of decline in kidney function to adjust treatment as needed.      Facundo STOVERC

## 2019-02-22 NOTE — PATIENT INSTRUCTIONS
Viral Rash in Children: Care Instructions  Your Care Instructions    Many viruses can cause a rash in children. Some viral rashes have a clear cause, like the ones caused by chickenpox or fifth disease. But for many viral rashes, doctors may not know the cause. When the virus goes away, in most cases the rash will go away. Symptoms of a viral rash depend on the type of virus and how your child's skin reacts to it. There may be redness, bumps, or raised areas. Some rashes may be itchy. Other viral symptoms may include a fever, a headache, a runny nose, a sore throat, belly pain, or diarrhea. Most viruses that cause rashes are easy to pass from one person to another. Talk to your doctor about when your child can go back to day care or school. Follow-up care is a key part of your child's treatment and safety. Be sure to make and go to all appointments, and call your doctor if your child is having problems. It's also a good idea to know your child's test results and keep a list of the medicines your child takes. How can you care for your child at home? · If the rash is itchy:  ? Use cold, wet cloths to reduce itching. ? Ask your doctor if you can give your child an over-the-counter antihistamine, such as Benadryl or Claritin. It might help to stop itching and discomfort. Read and follow all instructions on the label. · If your doctor prescribed medicine, give it exactly as directed. Be safe with medicines. Call your doctor if you think your child is having a problem with his or her medicine. When should you call for help? Call your doctor now or seek immediate medical care if:    · Your child has symptoms of a new or worse infection, such as:  ? Increased pain, swelling, warmth, or redness. ? Red streaks leading from the area. ? Pus draining from the area.   ? A fever.     · Your child seems to be getting sicker.     · Your child has new blisters or bruises.    Watch closely for changes in your child's health, and be sure to contact your doctor if:    · Your child does not get better as expected. Where can you learn more? Go to http://tin-tabby.info/. Enter V100 in the search box to learn more about \"Viral Rash in Children: Care Instructions. \"  Current as of: April 17, 2018  Content Version: 11.9  © 4563-6862 [employer]. Care instructions adapted under license by [employer] (which disclaims liability or warranty for this information). If you have questions about a medical condition or this instruction, always ask your healthcare professional. [place] [address] any warranty or liability for your use of this information. Shingles: Care Instructions  Your Care Instructions    Shingles (herpes zoster) causes pain and a blistered rash. The rash can appear anywhere on the body but will be on only one side of the body, the left or right. It will be in a band, a strip, or a small area. The pain can be very severe. Shingles can also cause tingling or itching in the area of the rash. The blisters scab over after a few days and heal in 2 to 4 weeks. Medicines can help you feel better and may help prevent more serious problems caused by shingles. Shingles is caused by the same virus that causes chickenpox. When you have chickenpox, the virus gets into your nerve roots and stays there (becomes dormant) long after you get over the chickenpox. If the virus becomes active again, it can cause shingles. Follow-up care is a key part of your treatment and safety. Be sure to make and go to all appointments, and call your doctor if you are having problems. It's also a good idea to know your test results and keep a list of the medicines you take. How can you care for yourself at home? · Be safe with medicines. Take your medicines exactly as prescribed. Call your doctor if you think you are having a problem with your medicine.  Antiviral medicine helps you get better faster. · Try not to scratch or pick at the blisters. They will crust over and fall off on their own if you leave them alone. · Put cool, wet cloths on the area to relieve pain and itching. You can also use calamine lotion. Try not to use so much lotion that it cakes and is hard to get off. · Put cornstarch or baking soda on the sores to help dry them out so they heal faster. · Do not use thick ointment, such as petroleum jelly, on the sores. This will keep them from drying and healing. · To help remove loose crusts, soak them in tap water. This can help decrease oozing, and dry and soothe the skin. · Take an over-the-counter pain medicine, such as acetaminophen (Tylenol), ibuprofen (Advil, Motrin), or naproxen (Aleve). Read and follow all instructions on the label. · Avoid close contact with people until the blisters have healed. It is very important for you to avoid contact with anyone who has never had chickenpox or the chickenpox vaccine. Pregnant women, young babies, and anyone else who has a hard time fighting infection (such as someone with HIV, diabetes, or cancer) is especially at risk. When should you call for help? Call your doctor now or seek immediate medical care if:    · You have a new or higher fever.     · You have a severe headache and a stiff neck.     · You lose the ability to think clearly.     · The rash spreads to your forehead, nose, eyes, or eyelids.     · You have eye pain, or your vision gets worse.     · You have new pain in your face, or you cannot move the muscles in your face.     · Blisters spread to new parts of your body.    Watch closely for changes in your health, and be sure to contact your doctor if:    · The rash has not healed after 2 to 4 weeks.     · You still have pain after the rash has healed. Where can you learn more? Go to http://tin-tabby.info/.   W[place] in the search box to learn more about \"Shingles: Care Instructions. \"  Current as of: July 30, 2018  Content Version: 11.9  © 5226-6463 [employer], [facility]. Care instructions adapted under license by [employer] (which disclaims liability or warranty for this information). If you have questions about a medical condition or this instruction, always ask your healthcare professional. [place] [address] any warranty or liability for your use of this information.

## 2019-02-22 NOTE — ACP (ADVANCE CARE PLANNING)
Discussed importance of advanced medical directives with patient. Patient is capable of making decisions.   Jamey Ulloa NP-C

## 2019-02-22 NOTE — PROGRESS NOTES
Chief Complaint   Patient presents with    Rash    Side Pain     Patient is here with c/o rash with pain on both sides. Patient states stinging pain started first. Rash appeared and is very painful. She has hx of shingles. 1. Have you been to the ER, urgent care clinic since your last visit? Hospitalized since your last visit? No    2. Have you seen or consulted any other health care providers outside of the [address] [place] [address] since your last visit? Include any pap smears or colon screening. No  Health Maintenance Due   Topic Date Due    Shingrix Vaccine Age 49> (1 of 2) 03/30/2012    MEDICARE YEARLY EXAM  10/29/2018     Health Maintenance reviewed.

## 2019-10-03 RX ORDER — PRAVASTATIN SODIUM 20 MG/1
TABLET ORAL
Qty: 90 TAB | Refills: 1 | Status: SHIPPED | OUTPATIENT
Start: 2019-10-03 | End: 2020-09-24 | Stop reason: SDUPTHER

## 2019-10-03 NOTE — TELEPHONE ENCOUNTER
Requested Prescriptions     Pending Prescriptions Disp Refills    pravastatin (PRAVACHOL) 20 mg tablet 90 Tab 3       Requested by pharmacy.

## 2020-04-27 RX ORDER — PRAVASTATIN SODIUM 20 MG/1
TABLET ORAL
Qty: 90 TAB | Refills: 1 | OUTPATIENT
Start: 2020-04-27

## 2020-04-29 RX ORDER — PRAVASTATIN SODIUM 20 MG/1
TABLET ORAL
Qty: 90 TAB | Refills: 1 | OUTPATIENT
Start: 2020-04-29

## 2020-06-01 RX ORDER — PRAVASTATIN SODIUM 20 MG/1
TABLET ORAL
Qty: 90 TAB | Refills: 1 | OUTPATIENT
Start: 2020-06-01

## 2020-06-30 ENCOUNTER — TELEPHONE (OUTPATIENT)
Dept: FAMILY MEDICINE CLINIC | Age: 60
End: 2020-06-30

## 2020-06-30 NOTE — TELEPHONE ENCOUNTER
Pt calling office, she voiced that 1 week ago she felt a \"popping in her knee\" and she has swelling and her knee is \"black and blue. \"    Advised to go to UC or ER for symptoms, she voiced understanding.

## 2020-07-01 ENCOUNTER — HOSPITAL ENCOUNTER (EMERGENCY)
Age: 60
Discharge: HOME | End: 2020-07-01
Attending: EMERGENCY MEDICINE
Payer: COMMERCIAL

## 2020-07-01 ENCOUNTER — APPOINTMENT (OUTPATIENT)
Dept: GENERAL RADIOLOGY | Age: 60
End: 2020-07-01
Attending: EMERGENCY MEDICINE
Payer: COMMERCIAL

## 2020-07-01 ENCOUNTER — APPOINTMENT (OUTPATIENT)
Dept: ULTRASOUND IMAGING | Age: 60
End: 2020-07-01
Attending: EMERGENCY MEDICINE
Payer: COMMERCIAL

## 2020-07-01 VITALS
WEIGHT: 182.54 LBS | HEART RATE: 78 BPM | RESPIRATION RATE: 20 BRPM | SYSTOLIC BLOOD PRESSURE: 137 MMHG | HEIGHT: 67 IN | TEMPERATURE: 98.4 F | DIASTOLIC BLOOD PRESSURE: 80 MMHG | OXYGEN SATURATION: 97 % | BODY MASS INDEX: 28.65 KG/M2

## 2020-07-01 DIAGNOSIS — M25.561 PAIN AND SWELLING OF RIGHT KNEE: Primary | ICD-10-CM

## 2020-07-01 DIAGNOSIS — M25.461 PAIN AND SWELLING OF RIGHT KNEE: Primary | ICD-10-CM

## 2020-07-01 PROCEDURE — 99284 EMERGENCY DEPT VISIT MOD MDM: CPT

## 2020-07-01 PROCEDURE — 93971 EXTREMITY STUDY: CPT

## 2020-07-01 PROCEDURE — 73590 X-RAY EXAM OF LOWER LEG: CPT

## 2020-07-01 PROCEDURE — 74011250637 HC RX REV CODE- 250/637: Performed by: EMERGENCY MEDICINE

## 2020-07-01 PROCEDURE — 73564 X-RAY EXAM KNEE 4 OR MORE: CPT

## 2020-07-01 RX ORDER — ACETAMINOPHEN 500 MG
1000 TABLET ORAL
Status: COMPLETED | OUTPATIENT
Start: 2020-07-01 | End: 2020-07-01

## 2020-07-01 RX ORDER — CYCLOSPORINE 25 MG/1
75 CAPSULE, GELATIN COATED ORAL 2 TIMES DAILY
COMMUNITY
End: 2021-01-04 | Stop reason: ALTCHOICE

## 2020-07-01 RX ADMIN — ACETAMINOPHEN 1000 MG: 500 TABLET ORAL at 07:25

## 2020-07-01 NOTE — DISCHARGE INSTRUCTIONS
You were evaluated in the emergency department for right knee pain and swelling. Your examination was reassuring as was your work-up including xray and ultrasound of the right leg. It will be important for you to follow-up with your primary care physician in 5-7 days, you can also make an appointment with Orthopedics if symptoms do not improve in one week. If you develop worsening symptoms such as pain, swelling, or any numbness or weakness, please return to the emergency department immediately.

## 2020-07-01 NOTE — PROGRESS NOTES
Pharmacy Clarification of Prior to Admission Medication Regimen     The patient was  interviewed regarding clarification of the prior to admission medication regimen. Patient present in room and obtained permission from patient to discuss drug regimen with visitor(s) present. Patient was questioned regarding use of any other inhalers, topical products, over the counter medications, herbal medications, vitamin products or ophthalmic/nasal/otic medication use. Information Obtained From: Patient    Pertinent Pharmacy Findings:  Updated patients preferred outpatient pharmacy to: [facility] and [facility] medication list was corrected to the following:     Prior to Admission Medications   Prescriptions Last Dose Informant Taking?   azaTHIOprine (IMURAN) 50 mg tablet 2020 at 2100  Yes   Sig: Take 50 mg by mouth daily. cyanocobalamin (VITAMIN B12) 1,000 mcg/mL injection 6/15/2020 at Unknown time  Yes   Si,000 mcg by IntraMUSCular route every fourteen (14) days. cycloSPORINE (SandIMMUNE) 25 mg capsule 2020 at 0800  Yes   Sig: Take 75 mg by mouth two (2) times a day. ergocalciferol (VITAMIN D2) 50,000 unit capsule 2020 at Unknown time  Yes   Sig: Take 50,000 Units by mouth every seven (7) days. gabapentin (NEURONTIN) 800 mg tablet 2020 at 0800  Yes   Sig: Take 800 mg by mouth two (2) times a day. May take addition 1/2 - 1 tab in the afternoon as needed   pramipexole (MIRAPEX) 0.125 mg tablet 2020 at 2100  Yes   Sig: Take 0.125 mg by mouth nightly. pravastatin (PRAVACHOL) 20 mg tablet 2020 at 2100  Yes   Sig: TAKE 1 TABLET NIGHTLY   predniSONE (DELTASONE) 5 mg tablet 2020 at 0800  Yes   Sig: Take 2.5 mg by mouth daily.       Facility-Administered Medications: None          Thank you,  Smliey Martínez CPhT  Medication History

## 2020-07-01 NOTE — ED PROVIDER NOTES
EMERGENCY DEPARTMENT HISTORY AND PHYSICAL EXAM      Date: 7/1/2020  Patient Name: Morris Shannon    History of Presenting Illness     Chief Complaint   Patient presents with    Knee Swelling     pt arrived ambulatory, pt stated she bend down at home to pick something up and felt and \"pop\" in her right knee 1 week ago. Right knee is swollen. Denies CP, SOB, cough, fever, or sick contacts         History Provided By: Patient    HPI: Morris Shannon, 64 y.o. female with history of remote renal transplant in 1990, CKD, peripheral neuropathy presents to the ED with cc of right knee and calf pain and swelling. Symptoms have been present over the past week. Patient states that about 1 week ago she bent down to pick something up off the floor and felt a pop in her right knee. She has had pain, ecchymosis, swelling to the right knee and proximal tibia area over the past week. She has been ambulatory although with some difficulty, she has still been able to get around her house in her yard and doing everything that she needs to do. She states that symptoms are worse when she sleeps and she has increased pain and paresthesias when she flexes her knee. Denies any new numbness, weakness. She became concerned today when she noticed increasing swelling and ecchymosis down the calf, she wants to make sure there is no blood clot or fracture. No prior injury to this knee or leg before. Denies any chest pain or shortness of breath. There are no other complaints, changes, or physical findings at this time. PCP: Shayne Marquez MD    No current facility-administered medications on file prior to encounter. Current Outpatient Medications on File Prior to Encounter   Medication Sig Dispense Refill    cycloSPORINE (SandIMMUNE) 25 mg capsule Take 75 mg by mouth two (2) times a day.       pravastatin (PRAVACHOL) 20 mg tablet TAKE 1 TABLET NIGHTLY 90 Tab 1    azaTHIOprine (IMURAN) 50 mg tablet Take 50 mg by mouth daily.  ergocalciferol (VITAMIN D2) 50,000 unit capsule Take 50,000 Units by mouth every seven (7) days.  predniSONE (DELTASONE) 5 mg tablet Take 2.5 mg by mouth daily.  pramipexole (MIRAPEX) 0.125 mg tablet Take 0.125 mg by mouth nightly.  gabapentin (NEURONTIN) 800 mg tablet Take 800 mg by mouth two (2) times a day. May take addition 1/2 - 1 tab in the afternoon as needed      cyanocobalamin (VITAMIN B12) 1,000 mcg/mL injection 1,000 mcg by IntraMUSCular route every fourteen (14) days.  [DISCONTINUED] calamine (CALADRYL) topical lotion Apply to affected tid prn 120 mL 0    [DISCONTINUED] HYDROcodone-acetaminophen (NORCO) 5-325 mg per tablet Take 1 Tab by mouth every four (4) hours as needed for Pain. Max Daily Amount: 6 Tabs. 20 Tab 0    [DISCONTINUED] ondansetron (ZOFRAN ODT) 4 mg disintegrating tablet Take 1 Tab by mouth every eight (8) hours as needed for Nausea. 30 Tab 0    [DISCONTINUED] cetirizine (ZYRTEC) 10 mg tablet Take 1 Tab by mouth daily. 30 Tab 0    [DISCONTINUED] RANITIDINE HCL (ZANTAC PO) Take 150 mg by mouth daily as needed.  [DISCONTINUED] cyclosporine (SANDIMMUNE) 100 mg capsule Take 75 mg by mouth two (2) times a day.          Past History     Past Medical History:  Past Medical History:   Diagnosis Date    Abnormal CT scan of lung 11/16/2018    Scarring per pulmonary, not malignant    Arthritis     Basal cell carcinoma in situ of skin     removed from left cheek and left arm    Chronic kidney disease 1990    Renal Transplant    Diverticulitis     Hip pain     dr. Yaya Champagne    Hyperlipidemia     Hyperparathyroidism ([address]     Kidney replaced by transplant 1990    unknown etiology, Dr. Robert Monroy Peripheral neuropathy        Past Surgical History:  Past Surgical History:   Procedure Laterality Date    HX APPENDECTOMY      HX CHOLECYSTECTOMY      HX HYSTERECTOMY      HX PREMALIG/BENIGN SKIN LESION EXCISION  2017    HX TRANSPLANT  1990    kidney Family History:  Family History   Problem Relation Age of Onset   [address] [facility] Cancer Mother         breast    SLE Mother    [address] [facility] Breast Cancer Mother         35s    Emphysema Father     Breast Cancer Maternal Grandmother         late 62s       Social History:  Social History     Tobacco Use    Smoking status: Never Smoker    Smokeless tobacco: Never Used   Substance Use Topics    Alcohol use: No     Alcohol/week: 0.0 standard drinks    Drug use: No       Allergies: Allergies   Allergen Reactions    Morphine Anaphylaxis         Review of Systems   Review of Systems   Constitutional: Negative for chills and fever. Respiratory: Negative for shortness of breath. Cardiovascular: Negative for chest pain. Gastrointestinal: Negative for nausea and vomiting. Musculoskeletal: Positive for arthralgias, gait problem and joint swelling. Skin: Positive for color change. Negative for wound. Neurological: Negative for weakness and numbness. All other systems reviewed and are negative. Physical Exam   Physical Exam  Vitals signs and nursing note reviewed. Constitutional:       General: She is not in acute distress. Appearance: Normal appearance. She is not ill-appearing, toxic-appearing or diaphoretic. HENT:      Head: Normocephalic and atraumatic. Cardiovascular:      Rate and Rhythm: Normal rate and regular rhythm. Heart sounds: Normal heart sounds. No murmur. Pulmonary:      Effort: Pulmonary effort is normal. No respiratory distress. Breath sounds: Normal breath sounds. No wheezing. Abdominal:      Palpations: Abdomen is soft. Tenderness: There is no abdominal tenderness. There is no guarding or rebound. Musculoskeletal:      Comments: Full range of motion right lower extremity at hip, knee, ankle. There is mild knee swelling with minimal effusion, there is moderate ecchymosis near tibial plateau and extending down throughout anterior and posterior calf.   Compartments soft throughout right lower extremity. Motor and sensory intact distally with 2+ DP pulse. There is bony TTP located to proximal tibia and medial and lateral aspect of the right knee, patella is nontender and mobile. Skin:     General: Skin is warm and dry. Findings: No erythema or rash. Neurological:      General: No focal deficit present. Mental Status: She is alert and oriented to person, place, and time. Diagnostic Study Results     Labs -   No results found for this or any previous visit (from the past 12 hour(s)). Radiologic Studies -   XR TIB/FIB RT   Final Result   IMPRESSION: No acute bone abnormality. XR KNEE RT MIN 4 V   Final Result   IMPRESSION: No acute abnormality. CT Results  (Last 48 hours)    None        CXR Results  (Last 48 hours)    None          Medical Decision Making   I am the first provider for this patient. I reviewed the vital signs, available nursing notes, past medical history, past surgical history, family history and social history. Vital Signs-Reviewed the patient's vital signs. Patient Vitals for the past 12 hrs:   Temp Pulse Resp BP SpO2   07/01/20 1000    137/80 97 %   07/01/20 0930    129/79 98 %   07/01/20 0900    142/83 97 %   07/01/20 0830    133/79 98 %   07/01/20 0800    128/80 98 %   07/01/20 0730    121/78 96 %   07/01/20 0656 98.4 °F (36.9 °C) 78 20 136/71 97 %       Records Reviewed: Nursing Notes    Provider Notes (Medical Decision Making):   61-year-old female here with right knee swelling x1 week after she felt a pop while bending down to pick something up off the ground. She has exam as above. She is afebrile and vital signs are stable. Compartments are soft and I have low concern for compartment syndrome at this time. I do suspect ligamentous injury, meniscal injury, possible fracture.   Patient concern for DVT given the bruising and swelling to the gastric medius area and duplex ultrasound will also be obtained to rule out this. It does not sound like high mechanism for trauma and I very much doubt knee dislocation with spontaneous reduction, she has 2+ DP pulse and I do not feel that she requires CT angiogram at this time. Ultrasound negative. X-rays negative for acute bony pathology. Patient will be provided Ace wrap and encouraged to use rice therapy at home. Encouraged follow-up with orthopedics in 1 week if symptoms are not improving. Given strict return ED precautions and agrees plan as above. ED Course:   Initial assessment performed. The patients presenting problems have been discussed, and they are in agreement with the care plan formulated and outlined with them. I have encouraged them to ask questions as they arise throughout their visit. Discharge Note:  The patient has been re-evaluated and is ready for discharge. Reviewed available results with patient. Counseled patient on diagnosis and care plan. Patient has expressed understanding, and all questions have been answered. Patient agrees with plan and agrees to follow up as recommended, or to return to the ED if their symptoms worsen. Discharge instructions have been provided and explained to the patient, along with reasons to return to the ED. Disposition:  Discharge home    DISCHARGE PLAN:  1. Current Discharge Medication List        2. Follow-up Information     Follow up With Specialties Details Why Contact Info    Simran Brewer MD Orthopedic Surgery Call in 1 week As needed, If symptoms worsen [place] [address]  [address] [address] [phone]          3. Return to ED if worse     Diagnosis     Clinical Impression:   1. Pain and swelling of right knee        Attestations:    Deon Ballesteros MD    Please note that this dictation was completed with [employer], the [employer] voice recognition software.   Quite often unanticipated grammatical, syntax, homophones, and other interpretive errors are inadvertently transcribed by the computer software. Please disregard these errors. Please excuse any errors that have escaped final proofreading. Thank you.

## 2020-07-01 NOTE — ED NOTES
Pt states that about a week ago she was doing things in her garden and she bent down to do something and felt a sharp pain and hear a \"pop\" in her right knee. Pt said there was swelling and bruising present. Pt has been able to walk on it but has been going up and down on swelling and pain. Pt states that the swelling has gone all the way down to her ankle but the swelling in the ankle has subsided since. Today there is bruising and swelling in the right knee that wont go away. Pt was able to ambulate in the ED to her room. Pt denies sob chest pain fever chills. Bed in lowest position. A/o x4. Call bell within reach.

## 2020-07-01 NOTE — ED NOTES
Dr. Vandana Dao and Nelly Wharton RN reviewed discharge instructions with the patient. The patient verbalized understanding. All questions and concerns were addressed. The patient declined a wheelchair and is discharged ambulatory in the care of family members with instructions and prescriptions in hand. Pt is alert and oriented x 4. Respirations are clear and unlabored.

## 2020-07-01 NOTE — ED NOTES
Bedside and Verbal shift change report given to [facility] (oncoming nurse) by Clayton Joyce (offgoing nurse). Report included the following information SBAR, ED Summary, MAR and Recent Results.

## 2020-07-15 ENCOUNTER — VIRTUAL VISIT (OUTPATIENT)
Dept: FAMILY MEDICINE CLINIC | Age: 60
End: 2020-07-15

## 2020-07-15 ENCOUNTER — TELEPHONE (OUTPATIENT)
Dept: FAMILY MEDICINE CLINIC | Age: 60
End: 2020-07-15

## 2020-07-15 DIAGNOSIS — K57.92 DIVERTICULITIS: Primary | ICD-10-CM

## 2020-07-15 DIAGNOSIS — Z13.39 SCREENING FOR ALCOHOLISM: ICD-10-CM

## 2020-07-15 DIAGNOSIS — Z00.00 MEDICARE ANNUAL WELLNESS VISIT, SUBSEQUENT: ICD-10-CM

## 2020-07-15 RX ORDER — AMOXICILLIN AND CLAVULANATE POTASSIUM 875; 125 MG/1; MG/1
1 TABLET, FILM COATED ORAL EVERY 12 HOURS
Qty: 20 TAB | Refills: 0 | Status: SHIPPED | OUTPATIENT
Start: 2020-07-15 | End: 2020-12-18 | Stop reason: SDUPTHER

## 2020-07-15 NOTE — PROGRESS NOTES
Chief Complaint   Patient presents with    Medication Evaluation    Abdominal Pain     [facility]. Pt reports that she has been having symptoms that are similar to her previous episodes of diverticulitis. Pt started taking Cipro. Pt reports that had a low grade fever last week. Pt spoke with with transplant nurse at [facility]. Pt reports that in the past she was on amoxicillin for her diverticulitis. Britany Sanchez is a 64 y.o. female who was seen by synchronous (real-time) audio-video technology on 7/15/2020 for Medication Evaluation and Abdominal Pain        Assessment & Plan:   1. Diverticulitis  -take medication as written, reviewed with pt reasons to seek immediate medical attention, including lack of improvement or recurrent fever  - amoxicillin-clavulanate (AUGMENTIN) 875-125 mg per tablet; Take 1 Tab by mouth every twelve (12) hours for 10 days. Dispense: 20 Tab; Refill: 0    2. Medicare annual wellness visit, subsequent  -see below    3. Screening for alcoholism  - [place] ANNUAL ALCOHOL SCREEN 15 MIN      Subjective:       Prior to Admission medications    Medication Sig Start Date End Date Taking? Authorizing Provider   amoxicillin-clavulanate (AUGMENTIN) 875-125 mg per tablet Take 1 Tab by mouth every twelve (12) hours for 10 days. 7/15/20 7/25/20 Yes Len Arnold MD   cycloSPORINE (SandIMMUNE) 25 mg capsule Take 75 mg by mouth two (2) times a day. Provider, Historical   pravastatin (PRAVACHOL) 20 mg tablet TAKE 1 TABLET NIGHTLY 10/3/19   Len Arnold MD   azaTHIOprine (IMURAN) 50 mg tablet Take 50 mg by mouth daily. Provider, Historical   ergocalciferol (VITAMIN D2) 50,000 unit capsule Take 50,000 Units by mouth every seven (7) days. Provider, Historical   predniSONE (DELTASONE) 5 mg tablet Take 2.5 mg by mouth daily. Provider, Historical   pramipexole (MIRAPEX) 0.125 mg tablet Take 0.125 mg by mouth nightly.     Provider, Historical   gabapentin (NEURONTIN) 800 mg tablet Take 800 mg by mouth two (2) times a day. May take addition 1/2 - 1 tab in the afternoon as needed    Provider, Historical   cyanocobalamin (VITAMIN B12) 1,000 mcg/mL injection 1,000 mcg by IntraMUSCular route every fourteen (14) days. Provider, Historical     Patient Active Problem List   Diagnosis Code    Kidney replaced by transplant Z94.0    Hyperlipidemia E78.5    Peripheral neuropathy G62.9    Gout M10.9    GERD (gastroesophageal reflux disease) K21.9    Anemia D64.9    Hyperparathyroidism, secondary renal ([facility] [address] N25.81    Skin lesion L98.9       Review of Systems   Constitutional: Negative for chills, fever and malaise/fatigue. HENT: Negative for congestion and sore throat. Respiratory: Negative for cough and shortness of breath. Cardiovascular: Negative for chest pain and palpitations. Gastrointestinal: Positive for abdominal pain. Negative for heartburn, nausea and vomiting. Genitourinary: Negative for dysuria and urgency. Musculoskeletal: Negative for joint pain and myalgias. Neurological: Negative for dizziness, tingling and headaches. All other systems reviewed and are negative.       Objective:     Patient-Reported Vitals 7/15/2020   Patient-Reported Weight 179 lbs   Patient-Reported Height 5 ft 7 in   Patient-Reported Pulse 68   Patient-Reported Systolic  739   Patient-Reported Diastolic 70        [INSTRUCTIONS:  \"[x]\" Indicates a positive item  \"[]\" Indicates a negative item  -- DELETE ALL ITEMS NOT EXAMINED]    Constitutional: [x] Appears well-developed and well-nourished [x] No apparent distress      [] Abnormal -     Mental status: [x] Alert and awake  [x] Oriented to person/place/time [x] Able to follow commands    [] Abnormal -     Eyes:   EOM    [x]  Normal    [] Abnormal -   Sclera  [x]  Normal    [] Abnormal -          Discharge [x]  None visible   [] Abnormal -     HENT: [x] Normocephalic, atraumatic  [] Abnormal - [x] Mouth/Throat: Mucous membranes are moist    External Ears [x] Normal  [] Abnormal -    Neck: [x] No visualized mass [] Abnormal -     Pulmonary/Chest: [x] Respiratory effort normal   [x] No visualized signs of difficulty breathing or respiratory distress        [] Abnormal -      Musculoskeletal:   [x] Normal gait with no signs of ataxia         [x] Normal range of motion of neck        [] Abnormal -     Neurological:        [x] No Facial Asymmetry (Cranial nerve 7 motor function) (limited exam due to video visit)          [x] No gaze palsy        [] Abnormal -          Skin:        [x] No significant exanthematous lesions or discoloration noted on facial skin         [] Abnormal -            Psychiatric:       [x] Normal Affect [] Abnormal -        [x] No Hallucinations    Other pertinent observable physical exam findings:-        We discussed the expected course, resolution and complications of the diagnosis(es) in detail. Medication risks, benefits, costs, interactions, and alternatives were discussed as indicated. I advised her to contact the office if her condition worsens, changes or fails to improve as anticipated. She expressed understanding with the diagnosis(es) and plan. Saman Ricardo, who was evaluated through a patient-initiated, synchronous (real-time) audio-video encounter, and/or her healthcare decision maker, is aware that it is a billable service, with coverage as determined by her insurance carrier. She provided verbal consent to proceed: Yes, and patient identification was verified. It was conducted pursuant to the emergency declaration under the [address] [place] [address], [address] [place] [address] and the [employer] and [employer] Act. A caregiver was present when appropriate. Ability to conduct physical exam was limited. I was in the office. The patient was at home.       Cara Youngblood MD        This is the Subsequent Medicare Annual Wellness Exam, performed 12 months or more after the Initial AWV or the last Subsequent AWV    I have reviewed the patient's medical history in detail and updated the computerized patient record. History     Patient Active Problem List   Diagnosis Code    Kidney replaced by transplant Z94.0    Hyperlipidemia E78.5    Peripheral neuropathy G62.9    Gout M10.9    GERD (gastroesophageal reflux disease) K21.9    Anemia D64.9    Hyperparathyroidism, secondary renal ([facility] [address] N25.81    Skin lesion L98.9     Past Medical History:   Diagnosis Date    Abnormal CT scan of lung 11/16/2018    Scarring per pulmonary, not malignant    Arthritis     Basal cell carcinoma in situ of skin     removed from left cheek and left arm    Chronic kidney disease 1990    Renal Transplant    Diverticulitis     Hip pain     dr. Nicola Jain    Hyperlipidemia     Hyperparathyroidism ([facility] [address]     Kidney replaced by transplant 1990    unknown etiology, Dr. Melissa Valle Peripheral neuropathy       Past Surgical History:   Procedure Laterality Date    HX APPENDECTOMY      HX CHOLECYSTECTOMY      HX HYSTERECTOMY      HX PREMALIG/BENIGN SKIN LESION EXCISION  2017    HX TRANSPLANT  1990    kidney     Current Outpatient Medications   Medication Sig Dispense Refill    amoxicillin-clavulanate (AUGMENTIN) 875-125 mg per tablet Take 1 Tab by mouth every twelve (12) hours for 10 days. 20 Tab 0    cycloSPORINE (SandIMMUNE) 25 mg capsule Take 75 mg by mouth two (2) times a day.  pravastatin (PRAVACHOL) 20 mg tablet TAKE 1 TABLET NIGHTLY 90 Tab 1    azaTHIOprine (IMURAN) 50 mg tablet Take 50 mg by mouth daily.  ergocalciferol (VITAMIN D2) 50,000 unit capsule Take 50,000 Units by mouth every seven (7) days.  predniSONE (DELTASONE) 5 mg tablet Take 2.5 mg by mouth daily.  pramipexole (MIRAPEX) 0.125 mg tablet Take 0.125 mg by mouth nightly.       gabapentin (NEURONTIN) 800 mg tablet Take 800 mg by mouth two (2) times a day. May take addition 1/2 - 1 tab in the afternoon as needed      cyanocobalamin (VITAMIN B12) 1,000 mcg/mL injection 1,000 mcg by IntraMUSCular route every fourteen (14) days. Allergies   Allergen Reactions    Morphine Anaphylaxis       Family History   Problem Relation Age of Onset    Cancer Mother         breast    SLE Mother     Breast Cancer Mother         35s    Emphysema Father     Breast Cancer Maternal Grandmother         late 62s     Social History     Tobacco Use    Smoking status: Never Smoker    Smokeless tobacco: Never Used   Substance Use Topics    Alcohol use: No     Alcohol/week: 0.0 standard drinks       Depression Risk Factor Screening:     3 most recent PHQ Screens 7/15/2020   Little interest or pleasure in doing things Not at all   Feeling down, depressed, irritable, or hopeless Not at all   Total Score PHQ 2 0       Alcohol Risk Factor Screening:   Do you average 1 drink per night or more than 7 drinks a week:  No    On any one occasion in the past three months have you have had more than 3 drinks containing alcohol:  No      Functional Ability and Level of Safety:   Hearing: Hearing is good. Activities of Daily Living: The home contains: no safety equipment. Patient does total self care     Ambulation: with difficulty, uses a cane     Fall Risk:  Fall Risk Assessment, last 12 mths 1/24/2019   Able to walk? Yes   Fall in past 12 months? Yes   Fall with injury?  Yes   Number of falls in past 12 months 1   Fall Risk Score 2     Abuse Screen:  Patient is not abused       Cognitive Screening   Has your family/caregiver stated any concerns about your memory: no      Patient Care Team   Patient Care Team:  Valente Santillan MD as PCP - General (Family Practice)  Gabriel Weldon NP as PCP - Maninder Roberts Provider    Assessment/Plan   Education and counseling provided:  Are appropriate based on today's review and evaluation    Diagnoses and all orders for this visit:    1. Diverticulitis  -     amoxicillin-clavulanate (AUGMENTIN) 875-125 mg per tablet; Take 1 Tab by mouth every twelve (12) hours for 10 days. 2. Medicare annual wellness visit, subsequent    3. Screening for alcoholism  -     [place] ANNUAL ALCOHOL SCREEN 15 MIN        Health Maintenance Due   Topic Date Due    Medicare Yearly Exam  10/29/2018    Lipid Screen  11/09/2018    Shingrix Vaccine Age 50> (2 of 2) 11/06/2019    Breast Cancer Screen Mammogram  11/30/2019    Colonoscopy  06/01/2020       Trav Flores, who was evaluated through a synchronous (real-time) audio-video encounter, and/or her healthcare decision maker, is aware that it is a billable service, with coverage as determined by her insurance carrier. She provided verbal consent to proceed: Yes, and patient identification was verified. It was conducted pursuant to the emergency declaration under the [address] [place] [address], [address] [place] [address] authority and the [employer] and [employer] Act. A caregiver was present when appropriate. Ability to conduct physical exam was limited. I was in the office. The patient was at home.     Adrien Magallon MD

## 2020-07-15 NOTE — Clinical Note
Please fax office note to [facility].

## 2020-07-15 NOTE — TELEPHONE ENCOUNTER
Patient says she needs \"diverticulitis\" med called in to [facility] at [facility].  She can be reached at [phone]

## 2020-07-15 NOTE — PATIENT INSTRUCTIONS
Medicare Wellness Visit, Female     The best way to live healthy is to have a lifestyle where you eat a well-balanced diet, exercise regularly, limit alcohol use, and quit all forms of tobacco/nicotine, if applicable. Regular preventive services are another way to keep healthy. Preventive services (vaccines, screening tests, monitoring & exams) can help personalize your care plan, which helps you manage your own care. Screening tests can find health problems at the earliest stages, when they are easiest to treat. [facility] follows the current, evidence-based guidelines published by the [facility] Boogie Rasmussen ([facility]) when recommending preventive services for our patients. Because we follow these guidelines, sometimes recommendations change over time as research supports it. (For example, mammograms used to be recommended annually. Even though Medicare will still pay for an annual mammogram, the newer guidelines recommend a mammogram every two years for women of average risk). Of course, you and your doctor may decide to screen more often for some diseases, based on your risk and your co-morbidities (chronic disease you are already diagnosed with). Preventive services for you include:  - Medicare offers their members a free annual wellness visit, which is time for you and your primary care provider to discuss and plan for your preventive service needs. Take advantage of this benefit every year!  -All adults over the age of 72 should receive the recommended pneumonia vaccines. Current USPSTF guidelines recommend a series of two vaccines for the best pneumonia protection.   -All adults should have a flu vaccine yearly and a tetanus vaccine every 10 years.   -All adults age 48 and older should receive the shingles vaccines (series of two vaccines).       -All adults age 38-68 who are overweight should have a diabetes screening test once every three years.   -All adults born between [birth year] and 1963 should be screened once for Hepatitis C.  -Other screening tests and preventive services for persons with diabetes include: an eye exam to screen for diabetic retinopathy, a kidney function test, a foot exam, and stricter control over your cholesterol.   -Cardiovascular screening for adults with routine risk involves an electrocardiogram (ECG) at intervals determined by your doctor.   -Colorectal cancer screenings should be done for adults age 54-65 with no increased risk factors for colorectal cancer. There are a number of acceptable methods of screening for this type of cancer. Each test has its own benefits and drawbacks. Discuss with your doctor what is most appropriate for you during your annual wellness visit. The different tests include: colonoscopy (considered the best screening method), a fecal occult blood test, a fecal DNA test, and sigmoidoscopy.    -A bone mass density test is recommended when a woman turns 65 to screen for osteoporosis. This test is only recommended one time, as a screening. Some providers will use this same test as a disease monitoring tool if you already have osteoporosis. -Breast cancer screenings are recommended every other year for women of normal risk, age 54-69.  -Cervical cancer screenings for women over age 72 are only recommended with certain risk factors.      Here is a list of your current Health Maintenance items (your personalized list of preventive services) with a due date:  Health Maintenance Due   Topic Date Due    Annual Well Visit  10/29/2018    Cholesterol Test   11/09/2018    Shingles Vaccine (2 of 2) 11/06/2019    Mammogram  11/30/2019    Colonoscopy  06/01/2020 [place]top[place]

## 2020-09-24 ENCOUNTER — OFFICE VISIT (OUTPATIENT)
Dept: FAMILY MEDICINE CLINIC | Age: 60
End: 2020-09-24
Payer: COMMERCIAL

## 2020-09-24 VITALS
HEART RATE: 68 BPM | BODY MASS INDEX: 28.56 KG/M2 | RESPIRATION RATE: 18 BRPM | OXYGEN SATURATION: 98 % | SYSTOLIC BLOOD PRESSURE: 123 MMHG | HEIGHT: 67 IN | TEMPERATURE: 96.9 F | DIASTOLIC BLOOD PRESSURE: 80 MMHG | WEIGHT: 182 LBS

## 2020-09-24 DIAGNOSIS — E55.9 VITAMIN D DEFICIENCY, UNSPECIFIED: ICD-10-CM

## 2020-09-24 DIAGNOSIS — Z12.31 VISIT FOR SCREENING MAMMOGRAM: ICD-10-CM

## 2020-09-24 DIAGNOSIS — N25.81 HYPERPARATHYROIDISM, SECONDARY RENAL (HCC): ICD-10-CM

## 2020-09-24 DIAGNOSIS — E78.5 HYPERLIPIDEMIA, UNSPECIFIED HYPERLIPIDEMIA TYPE: ICD-10-CM

## 2020-09-24 DIAGNOSIS — D64.9 ANEMIA, UNSPECIFIED TYPE: ICD-10-CM

## 2020-09-24 DIAGNOSIS — R79.9 ABNORMAL FINDING OF BLOOD CHEMISTRY, UNSPECIFIED: ICD-10-CM

## 2020-09-24 DIAGNOSIS — Z00.00 ROUTINE GENERAL MEDICAL EXAMINATION AT A HEALTH CARE FACILITY: ICD-10-CM

## 2020-09-24 DIAGNOSIS — E78.2 MIXED HYPERLIPIDEMIA: Primary | ICD-10-CM

## 2020-09-24 DIAGNOSIS — Z23 NEEDS FLU SHOT: ICD-10-CM

## 2020-09-24 PROCEDURE — G8432 DEP SCR NOT DOC, RNG: HCPCS

## 2020-09-24 PROCEDURE — 90471 IMMUNIZATION ADMIN: CPT

## 2020-09-24 PROCEDURE — 36415 COLL VENOUS BLD VENIPUNCTURE: CPT | Performed by: FAMILY MEDICINE

## 2020-09-24 PROCEDURE — 90674 CCIIV4 VAC NO PRSV 0.5 ML IM: CPT

## 2020-09-24 PROCEDURE — 3017F COLORECTAL CA SCREEN DOC REV: CPT

## 2020-09-24 PROCEDURE — G8419 CALC BMI OUT NRM PARAM NOF/U: HCPCS

## 2020-09-24 PROCEDURE — 99396 PREV VISIT EST AGE 40-64: CPT

## 2020-09-24 PROCEDURE — G9899 SCRN MAM PERF RSLTS DOC: HCPCS

## 2020-09-24 PROCEDURE — 99000 SPECIMEN HANDLING OFFICE-LAB: CPT

## 2020-09-24 RX ORDER — PRAVASTATIN SODIUM 20 MG/1
TABLET ORAL
Qty: 90 TAB | Refills: 1 | Status: SHIPPED | OUTPATIENT
Start: 2020-09-24 | End: 2021-03-15

## 2020-09-24 NOTE — PROGRESS NOTES
1. Have you been to the ER, urgent care clinic since your last visit? Hospitalized since your last visit? No    2. Have you seen or consulted any other health care providers outside of the [address] [place] [address] since your last visit? Include any pap smears or colon screening. Yes.   Hand Surgeon      Health Maintenance Due   Topic Date Due    Lipid Screen  11/09/2018    Shingrix Vaccine Age 50> (2 of 2) 11/06/2019    Breast Cancer Screen Mammogram  11/30/2019    Colonoscopy  06/01/2020    Flu Vaccine (1) 09/01/2020

## 2020-09-24 NOTE — PATIENT INSTRUCTIONS
Vaccine Information Statement    Influenza (Flu) Vaccine (Inactivated or Recombinant): What You Need to Know    Many Vaccine Information Statements are available in [place] and other languages. See www.immunize.org/vis  Hojas de información sobre vacunas están disponibles en español y en muchos otros idiomas. Visite www.immunize.org/vis    1. Why get vaccinated? Influenza vaccine can prevent influenza (flu). Flu is a contagious disease that spreads around the [facility] every year, usually between October and May. Anyone can get the flu, but it is more dangerous for some people. Infants and young children, people 72years of age and older, pregnant women, and people with certain health conditions or a weakened immune system are at greatest risk of flu complications. Pneumonia, bronchitis, sinus infections and ear infections are examples of flu-related complications. If you have a medical condition, such as heart disease, cancer or diabetes, flu can make it worse. Flu can cause fever and chills, sore throat, muscle aches, fatigue, cough, headache, and runny or stuffy nose. Some people may have vomiting and diarrhea, though this is more common in children than adults. Each year thousands of people in the [facility] die from flu, and many more are hospitalized. Flu vaccine prevents millions of illnesses and flu-related visits to the doctor each year. 2. Influenza vaccines     CDC recommends everyone 10months of age and older get vaccinated every flu season. Children 6 months through 6years of age may need 2 doses during a single flu season. Everyone else needs only 1 dose each flu season. It takes about 2 weeks for protection to develop after vaccination. There are many flu viruses, and they are always changing. Each year a new flu vaccine is made to protect against three or four viruses that are likely to cause disease in the upcoming flu season.  Even when the vaccine doesnt exactly match these viruses, it may still provide some protection. Influenza vaccine does not cause flu. Influenza vaccine may be given at the same time as other vaccines. 3. Talk with your health care provider    Tell your vaccine provider if the person getting the vaccine:   Has had an allergic reaction after a previous dose of influenza vaccine, or has any severe, life-threatening allergies.  Has ever had Guillain-Barré Syndrome (also called GBS). In some cases, your health care provider may decide to postpone influenza vaccination to a future visit. People with minor illnesses, such as a cold, may be vaccinated. People who are moderately or severely ill should usually wait until they recover before getting influenza vaccine. Your health care provider can give you more information. 4. Risks of a reaction     Soreness, redness, and swelling where shot is given, fever, muscle aches, and headache can happen after influenza vaccine.  There may be a very small increased risk of Guillain-Barré Syndrome (GBS) after inactivated influenza vaccine (the flu shot). Gilmar Ready children who get the flu shot along with pneumococcal vaccine (PCV13), and/or DTaP vaccine at the same time might be slightly more likely to have a seizure caused by fever. Tell your health care provider if a child who is getting flu vaccine has ever had a seizure. People sometimes faint after medical procedures, including vaccination. Tell your provider if you feel dizzy or have vision changes or ringing in the ears. As with any medicine, there is a very remote chance of a vaccine causing a severe allergic reaction, other serious injury, or death. 5. What if there is a serious problem? An allergic reaction could occur after the vaccinated person leaves the clinic.  If you see signs of a severe allergic reaction (hives, swelling of the face and throat, difficulty breathing, a fast heartbeat, dizziness, or weakness), call 9-1-1 and get the person to the nearest hospital.    For other signs that concern you, call your health care provider. Adverse reactions should be reported to the Vaccine Adverse Event Reporting System (VAERS). Your health care provider will usually file this report, or you can do it yourself. Visit the VAERS website at www.vaers. [facility].gov or call 3-211.961.1771. VAERS is only for reporting reactions, and VAERS staff do not give medical advice. 6. The National Vaccine Injury Compensation Program    The [facility] Vaccine Injury Compensation Program (VICP) is a federal program that was created to compensate people who may have been injured by certain vaccines. Visit the VICP website at www.hrsa.gov/vaccinecompensation or call 5-261.641.9483 to learn about the program and about filing a claim. There is a time limit to file a claim for compensation. 7. How can I learn more?  Ask your health care provider.  Call your local or state health department.  Contact the Centers for Disease Control and Prevention (CDC):  - Call 4-645.601.4256 (1-800-CDC-INFO) or  - Visit CDCs influenza website at www.cdc.gov/flu    Vaccine Information Statement (Interim)  Inactivated Influenza Vaccine   8/15/2019  42 LINCOLN Mcbride [id number]   Department of Health and Human Services  Centers for Disease Control and Prevention    Office Use Only

## 2020-09-24 NOTE — PROGRESS NOTES
Chief Complaint   Patient presents with    Complete Physical     Pt needs a physical for her insurance. Patient denies any problem at this time, reports that she has been doing well. Patient brought the list of requirements for her insurance physical based upon the test that were done on her . Patient reports that in the past she has gone to her 's place of employment to have the testing done, however this year that was not an option due to [place]. Patient agrees to a flu vaccine today and would like to have an updated order for mammogram placed. Subjective: (As above and below)     Chief Complaint   Patient presents with    Complete Physical     she is a 64y.o. year old female who presents for evaluation. Reviewed PmHx, RxHx, FmHx, SocHx, AllgHx and updated in chart. Review of Systems - negative except as listed above    Objective:     Vitals:    09/24/20 0946   BP: 123/80   Pulse: 68   Resp: 18   Temp: 96.9 °F (36.1 °C)   TempSrc: Temporal   SpO2: 98%   Weight: 182 lb (82.6 kg)   Height: 5' 7\" (1.702 m)     Physical Examination: General appearance - alert, well appearing, and in no distress  Mental status - normal mood, behavior, speech, dress, motor activity, and thought processes  Ears - bilateral TM's and external ear canals normal  Chest - clear to auscultation, no wheezes, rales or rhonchi, symmetric air entry  Heart - normal rate, regular rhythm, normal S1, S2, no murmurs, rubs, clicks or gallops  Musculoskeletal - no joint tenderness, deformity or swelling  Extremities - peripheral pulses normal, no pedal edema, no clubbing or cyanosis    Assessment/ Plan:   1. Mixed hyperlipidemia  -check labs  - METABOLIC PANEL, COMPREHENSIVE  - LIPID PANEL  - COLLECTION VENOUS BLOOD,VENIPUNCTURE  - IA HANDLG&/OR CONVEY OF SPEC FOR TR OFFICE TO LAB    2.  Hyperparathyroidism, secondary renal ([address]  -check labs  - METABOLIC PANEL, COMPREHENSIVE  - COLLECTION VENOUS BLOOD,VENIPUNCTURE  - IA HANDLG&/OR CONVEY OF SPEC FOR TR OFFICE TO LAB    3. Anemia, unspecified type  - CBC WITH AUTOMATED DIFF  - VITAMIN D, 25 HYDROXY  - TSH 3RD GENERATION  - COLLECTION VENOUS BLOOD,VENIPUNCTURE  - ND HANDLG&/OR CONVEY OF SPEC FOR TR OFFICE TO LAB    4. Hyperlipidemia, unspecified hyperlipidemia type  - HEMOGLOBIN A1C WITH EAG  - COLLECTION VENOUS BLOOD,VENIPUNCTURE  - ND HANDLG&/OR CONVEY OF SPEC FOR TR OFFICE TO LAB    5. Routine general medical examination at a health care facility  -reviewed preventative health measures  - NICOTINE METABOLITE    6. Visit for screening mammogram  - [facility] MAMMO BI SCREENING INCL CAD; Future    7. Needs flu shot  - INFLUENZA, INJECTABLE, MDCK, PRESERVATIVE FREE, QUADRIVALENT    8. Abnormal finding of blood chemistry, unspecified   - HEMOGLOBIN A1C WITH EAG    9. Vitamin D deficiency, unspecified   - VITAMIN D, 25 HYDROXY       I have discussed the diagnosis with the patient and the intended plan as seen in the above orders. The patient has received an after-visit summary and questions were answered concerning future plans.      Medication Side Effects and Warnings were discussed with patient: yes  Patient Labs were reviewed: yes  Patient Past Records were reviewed:  yes    Darin Quiroga M.D.

## 2020-09-26 LAB
25(OH)D3+25(OH)D2 SERPL-MCNC: 33.6 NG/ML (ref 30–100)
ALBUMIN SERPL-MCNC: 4.4 G/DL (ref 3.8–4.9)
ALBUMIN/GLOB SERPL: 1.6 {RATIO} (ref 1.2–2.2)
ALP SERPL-CCNC: 95 IU/L (ref 39–117)
ALT SERPL-CCNC: 15 IU/L (ref 0–32)
AST SERPL-CCNC: 18 IU/L (ref 0–40)
BASOPHILS # BLD AUTO: 0.1 X10E3/UL (ref 0–0.2)
BASOPHILS NFR BLD AUTO: 1 %
BILIRUB SERPL-MCNC: 0.4 MG/DL (ref 0–1.2)
BUN SERPL-MCNC: 31 MG/DL (ref 6–24)
BUN/CREAT SERPL: 18 (ref 9–23)
CALCIUM SERPL-MCNC: 10 MG/DL (ref 8.7–10.2)
CHLORIDE SERPL-SCNC: 105 MMOL/L (ref 96–106)
CHOLEST SERPL-MCNC: 234 MG/DL (ref 100–199)
CO2 SERPL-SCNC: 24 MMOL/L (ref 20–29)
COTININE SERPL QL: NEGATIVE
CREAT SERPL-MCNC: 1.75 MG/DL (ref 0.57–1)
EOSINOPHIL # BLD AUTO: 0.2 X10E3/UL (ref 0–0.4)
EOSINOPHIL NFR BLD AUTO: 2 %
ERYTHROCYTE [DISTWIDTH] IN BLOOD BY AUTOMATED COUNT: 13.4 % (ref 11.7–15.4)
EST. AVERAGE GLUCOSE BLD GHB EST-MCNC: 120 MG/DL
GLOBULIN SER CALC-MCNC: 2.7 G/DL (ref 1.5–4.5)
GLUCOSE SERPL-MCNC: 94 MG/DL (ref 65–99)
HBA1C MFR BLD: 5.8 % (ref 4.8–5.6)
HCT VFR BLD AUTO: 35.7 % (ref 34–46.6)
HDLC SERPL-MCNC: 54 MG/DL
HGB BLD-MCNC: 11.7 G/DL (ref 11.1–15.9)
IMM GRANULOCYTES # BLD AUTO: 0 X10E3/UL (ref 0–0.1)
IMM GRANULOCYTES NFR BLD AUTO: 0 %
INTERPRETATION, 910389: NORMAL
INTERPRETATION: NORMAL
LDLC SERPL CALC-MCNC: 121 MG/DL (ref 0–99)
LYMPHOCYTES # BLD AUTO: 2.4 X10E3/UL (ref 0.7–3.1)
LYMPHOCYTES NFR BLD AUTO: 30 %
MCH RBC QN AUTO: 28.4 PG (ref 26.6–33)
MCHC RBC AUTO-ENTMCNC: 32.8 G/DL (ref 31.5–35.7)
MCV RBC AUTO: 87 FL (ref 79–97)
MONOCYTES # BLD AUTO: 0.6 X10E3/UL (ref 0.1–0.9)
MONOCYTES NFR BLD AUTO: 8 %
NEUTROPHILS # BLD AUTO: 4.8 X10E3/UL (ref 1.4–7)
NEUTROPHILS NFR BLD AUTO: 59 %
PDF IMAGE, 910387: NORMAL
PLATELET # BLD AUTO: 341 X10E3/UL (ref 150–450)
POTASSIUM SERPL-SCNC: 4.3 MMOL/L (ref 3.5–5.2)
PROT SERPL-MCNC: 7.1 G/DL (ref 6–8.5)
RBC # BLD AUTO: 4.12 X10E6/UL (ref 3.77–5.28)
SODIUM SERPL-SCNC: 142 MMOL/L (ref 134–144)
TRIGL SERPL-MCNC: 339 MG/DL (ref 0–149)
TSH SERPL DL<=0.005 MIU/L-ACNC: 0.84 UIU/ML (ref 0.45–4.5)
VLDLC SERPL CALC-MCNC: 59 MG/DL (ref 5–40)
WBC # BLD AUTO: 8 X10E3/UL (ref 3.4–10.8)

## 2020-09-27 NOTE — PROGRESS NOTES
Kidney function stable  Increase in risk for diabetes, please closely monitor diet and increase exercise   Your cholesterol came back looking pretty good. Your triglycerides are slightly elevated. The best way to bring that number down is to incorporate more omega 3's into your diet. Here are some suggestions on how to do that:  - eat 2-3 serving of fish like salmon and trout per week  - eat lots of fresh dark leafy green vegetables  - incorporate more garlic into your diet  All other labs are within normal limits.    Please inform

## 2020-10-02 ENCOUNTER — TELEPHONE (OUTPATIENT)
Dept: FAMILY MEDICINE CLINIC | Age: 60
End: 2020-10-02

## 2020-12-18 ENCOUNTER — TELEPHONE (OUTPATIENT)
Dept: FAMILY MEDICINE CLINIC | Age: 60
End: 2020-12-18

## 2020-12-18 DIAGNOSIS — K57.92 DIVERTICULITIS: ICD-10-CM

## 2020-12-18 RX ORDER — AMOXICILLIN AND CLAVULANATE POTASSIUM 875; 125 MG/1; MG/1
1 TABLET, FILM COATED ORAL EVERY 12 HOURS
Qty: 20 TAB | Refills: 0 | Status: SHIPPED | OUTPATIENT
Start: 2020-12-18 | End: 2020-12-28

## 2020-12-18 NOTE — TELEPHONE ENCOUNTER
Prescribed Augmentin. Last time this was prescribed was July 2020.   Note that she had several episodes of diverticulitis in 2018, Dr. Eric Forman tried to refer her to specialist.  Unclear what the outcome of that was

## 2020-12-18 NOTE — TELEPHONE ENCOUNTER
----- Message from [address] [place] sent at 12/18/2020 10:22 AM EST -----  Regarding: Dr. Arminda Nichols  Medication Refill    Caller (if not patient): N/A      Relationship of caller (if not patient): N/A      Best contact number(s): [phone]      Name of medication and dosage if known: amoxicillin-clavulanate (AUGMENTIN) 875-125 mg      Is patient out of this medication (yes/no): Yes      Pharmacy name: [facility]    Pharmacy listed in chart? (yes/no): Yes  Pharmacy phone number:      Details to clarify the request: Requesting refill for amoxicillin-clavulanate (AUGMENTIN) 875-125 mg rx to alleviate symptoms associated with diverticulitis.        Tyson Winters

## 2021-01-04 ENCOUNTER — VIRTUAL VISIT (OUTPATIENT)
Dept: FAMILY MEDICINE CLINIC | Age: 61
End: 2021-01-04
Payer: COMMERCIAL

## 2021-01-04 DIAGNOSIS — Z94.0 KIDNEY REPLACED BY TRANSPLANT: ICD-10-CM

## 2021-01-04 DIAGNOSIS — K57.92 DIVERTICULITIS: Primary | ICD-10-CM

## 2021-01-04 PROCEDURE — G8427 DOCREV CUR MEDS BY ELIG CLIN: HCPCS | Performed by: FAMILY MEDICINE

## 2021-01-04 PROCEDURE — G8432 DEP SCR NOT DOC, RNG: HCPCS | Performed by: FAMILY MEDICINE

## 2021-01-04 PROCEDURE — G9899 SCRN MAM PERF RSLTS DOC: HCPCS | Performed by: FAMILY MEDICINE

## 2021-01-04 PROCEDURE — 3017F COLORECTAL CA SCREEN DOC REV: CPT | Performed by: FAMILY MEDICINE

## 2021-01-04 PROCEDURE — 99213 OFFICE O/P EST LOW 20 MIN: CPT | Performed by: FAMILY MEDICINE

## 2021-01-04 PROCEDURE — G0463 HOSPITAL OUTPT CLINIC VISIT: HCPCS | Performed by: FAMILY MEDICINE

## 2021-01-04 RX ORDER — SIROLIMUS 1 MG/1
TABLET, FILM COATED ORAL
COMMUNITY
Start: 2020-12-27 | End: 2021-08-19

## 2021-01-04 NOTE — PROGRESS NOTES
Chief Complaint   Patient presents with    Cough    Fever    Diverticulitis     Pt was seen via virtual video visit. Pt reports that after taking the medication for diverticulitis she felt much better, but still had some low grade fever and cough. Fever has since resolved, cough has imrpoved    Pt was COVID tested by her transplant team. Negative result. Pt was given albuterol, which seems to be helping break up the congestion. Last colonoscopy was in 2015, pt is due every 5 years. Pt is reluctant to go back. Pt had it done at [facility]. Seretha Najjar is a 64 y.o. female who was seen by synchronous (real-time) audio-video technology on 1/4/2021 for Cough, Fever, and Diverticulitis        Assessment & Plan:   Diagnoses and all orders for this visit:    1. Diverticulitis  -Currently active symptoms resolved, recommended follow-up with her GI doctor for her colonoscopy. 2. Kidney replaced by transplant  Followed [facility] team [facility], will ask them for the name of her GI doctor. Medications updated and reviewed          Subjective:       Prior to Admission medications    Medication Sig Start Date End Date Taking? Authorizing Provider   sirolimus (RAPAMUNE) 1 mg tablet TAKE 5 TABLETS BY MOUTH FIRST DAY, THEN 2 TABLETS DAILY 12/27/20  Yes Provider, Historical   pravastatin (PRAVACHOL) 20 mg tablet TAKE 1 TABLET NIGHTLY 9/24/20  Yes Ranjit Arnold MD   azaTHIOprine (IMURAN) 50 mg tablet Take 25 mg by mouth daily. Yes Provider, Historical   ergocalciferol (VITAMIN D2) 50,000 unit capsule Take 50,000 Units by mouth every seven (7) days. Yes Provider, Historical   predniSONE (DELTASONE) 5 mg tablet Take 2.5 mg by mouth daily. Yes Provider, Historical   pramipexole (MIRAPEX) 0.125 mg tablet Take 0.125 mg by mouth nightly. Yes Provider, Historical   gabapentin (NEURONTIN) 800 mg tablet Take 800 mg by mouth two (2) times a day.  May take addition 1/2 - 1 tab in the afternoon as needed   Yes Provider, Historical   cyanocobalamin (VITAMIN B12) 1,000 mcg/mL injection 1,000 mcg by IntraMUSCular route every fourteen (14) days. Yes Provider, Historical   cycloSPORINE (SandIMMUNE) 25 mg capsule Take 75 mg by mouth two (2) times a day. 1/4/21  Provider, Historical     Patient Active Problem List   Diagnosis Code    Kidney replaced by transplant Z94.0    Hyperlipidemia E78.5    Peripheral neuropathy G62.9    Gout M10.9    GERD (gastroesophageal reflux disease) K21.9    Anemia D64.9    Hyperparathyroidism, secondary renal ([facility] [address] N25.81    Skin lesion L98.9     Current Outpatient Medications   Medication Sig Dispense Refill    sirolimus (RAPAMUNE) 1 mg tablet TAKE 5 TABLETS BY MOUTH FIRST DAY, THEN 2 TABLETS DAILY      pravastatin (PRAVACHOL) 20 mg tablet TAKE 1 TABLET NIGHTLY 90 Tab 1    azaTHIOprine (IMURAN) 50 mg tablet Take 25 mg by mouth daily.  ergocalciferol (VITAMIN D2) 50,000 unit capsule Take 50,000 Units by mouth every seven (7) days.  predniSONE (DELTASONE) 5 mg tablet Take 2.5 mg by mouth daily.  pramipexole (MIRAPEX) 0.125 mg tablet Take 0.125 mg by mouth nightly.  gabapentin (NEURONTIN) 800 mg tablet Take 800 mg by mouth two (2) times a day. May take addition 1/2 - 1 tab in the afternoon as needed      cyanocobalamin (VITAMIN B12) 1,000 mcg/mL injection 1,000 mcg by IntraMUSCular route every fourteen (14) days. Review of Systems   Constitutional: Negative for chills, fever and malaise/fatigue. HENT: Negative for congestion and sore throat. Respiratory: Negative for cough and shortness of breath. Cardiovascular: Negative for chest pain and palpitations. Gastrointestinal: Negative for abdominal pain, heartburn, nausea and vomiting. Genitourinary: Negative for dysuria and urgency. Musculoskeletal: Negative for joint pain and myalgias. Neurological: Negative for dizziness, tingling and headaches.    All other systems reviewed and are negative. Objective:     Patient-Reported Vitals 1/4/2021   Patient-Reported Weight 171 lbs   Patient-Reported Height -   Patient-Reported Pulse -   Patient-Reported Systolic  925   Patient-Reported Diastolic 70        [INSTRUCTIONS:  \"[x]\" Indicates a positive item  \"[]\" Indicates a negative item  -- DELETE ALL ITEMS NOT EXAMINED]    Constitutional: [x] Appears well-developed and well-nourished [x] No apparent distress      [] Abnormal -     Mental status: [x] Alert and awake  [x] Oriented to person/place/time [x] Able to follow commands    [] Abnormal -     Eyes:   EOM    [x]  Normal    [] Abnormal -   Sclera  [x]  Normal    [] Abnormal -          Discharge [x]  None visible   [] Abnormal -     HENT: [x] Normocephalic, atraumatic  [] Abnormal -   [x] Mouth/Throat: Mucous membranes are moist    External Ears [x] Normal  [] Abnormal -    Neck: [x] No visualized mass [] Abnormal -     Pulmonary/Chest: [x] Respiratory effort normal   [x] No visualized signs of difficulty breathing or respiratory distress        [] Abnormal -      Musculoskeletal:   [x] Normal gait with no signs of ataxia         [x] Normal range of motion of neck        [] Abnormal -     Neurological:        [x] No Facial Asymmetry (Cranial nerve 7 motor function) (limited exam due to video visit)          [x] No gaze palsy        [] Abnormal -          Skin:        [x] No significant exanthematous lesions or discoloration noted on facial skin         [] Abnormal -            Psychiatric:       [x] Normal Affect [] Abnormal -        [x] No Hallucinations    Other pertinent observable physical exam findings:-        We discussed the expected course, resolution and complications of the diagnosis(es) in detail. Medication risks, benefits, costs, interactions, and alternatives were discussed as indicated. I advised her to contact the office if her condition worsens, changes or fails to improve as anticipated.  She expressed understanding with the diagnosis(es) and plan. Carol Ann Manning, who was evaluated through a patient-initiated, synchronous (real-time) audio-video encounter, and/or her healthcare decision maker, is aware that it is a billable service, with coverage as determined by her insurance carrier. She provided verbal consent to proceed: Yes, and patient identification was verified. It was conducted pursuant to the emergency declaration under the [address] [place] [address] and the [employer] and [email or web address] General Act. A caregiver was present when appropriate. Ability to conduct physical exam was limited. I was in the office. The patient was at home.       Radha Hurtado MD

## 2021-01-04 NOTE — PROGRESS NOTES
Chief Complaint   Patient presents with    Cough    Fever    Diverticulitis     Health Maintenance reviewed     1. Have you been to the ER, urgent care clinic since your last visit? Hospitalized since your last visit? Yes, on file     2. Have you seen or consulted any other health care providers outside of the [address] [place] [address] since your last visit? Include any pap smears or colon screening.   No

## 2021-03-01 ENCOUNTER — VIRTUAL VISIT (OUTPATIENT)
Dept: FAMILY MEDICINE CLINIC | Age: 61
End: 2021-03-01
Payer: COMMERCIAL

## 2021-03-01 DIAGNOSIS — J40 BRONCHITIS: Primary | ICD-10-CM

## 2021-03-01 PROCEDURE — 3017F COLORECTAL CA SCREEN DOC REV: CPT | Performed by: FAMILY MEDICINE

## 2021-03-01 PROCEDURE — G8432 DEP SCR NOT DOC, RNG: HCPCS | Performed by: FAMILY MEDICINE

## 2021-03-01 PROCEDURE — G0463 HOSPITAL OUTPT CLINIC VISIT: HCPCS | Performed by: FAMILY MEDICINE

## 2021-03-01 PROCEDURE — 99213 OFFICE O/P EST LOW 20 MIN: CPT | Performed by: FAMILY MEDICINE

## 2021-03-01 PROCEDURE — G9899 SCRN MAM PERF RSLTS DOC: HCPCS | Performed by: FAMILY MEDICINE

## 2021-03-01 PROCEDURE — G8428 CUR MEDS NOT DOCUMENT: HCPCS | Performed by: FAMILY MEDICINE

## 2021-03-01 RX ORDER — AMOXICILLIN AND CLAVULANATE POTASSIUM 875; 125 MG/1; MG/1
1 TABLET, FILM COATED ORAL EVERY 12 HOURS
Qty: 20 TAB | Refills: 0 | Status: SHIPPED | OUTPATIENT
Start: 2021-03-01 | End: 2021-03-11

## 2021-03-01 RX ORDER — ALBUTEROL SULFATE 90 UG/1
2 AEROSOL, METERED RESPIRATORY (INHALATION)
Qty: 1 INHALER | Refills: 5 | Status: SHIPPED | OUTPATIENT
Start: 2021-03-01 | End: 2021-08-19

## 2021-03-01 NOTE — PROGRESS NOTES
Chief Complaint   Patient presents with    Cough    Nasal Congestion     Health Maintenance reviewed     1. Have you been to the ER, urgent care clinic since your last visit? Hospitalized since your last visit? No     2. Have you seen or consulted any other health care providers outside of the [address] [place] [address] since your last visit? Include any pap smears or colon screening?  No

## 2021-03-01 NOTE — PROGRESS NOTES
Chief Complaint   Patient presents with    Cough    Nasal Congestion     Pt was seen via virtual video visit. Pt reports that she has been dealing with a cough, chest congestion for 4-6 weeks. Pt was tested for COVID, was negative. Pt was given an albuterol inhaler, some short term benefit. Pt reports that she feels like has constant running in the back of her throat. Pt was using albuterol which gave her some short term relief, however it made her cough less intense. Libia Foster is a 64 y.o. female who was seen by synchronous (real-time) audio-video technology on 3/1/2021 for Cough and Nasal Congestion        Assessment & Plan:   Diagnoses and all orders for this visit:    1. Bronchitis  -     amoxicillin-clavulanate (AUGMENTIN) 875-125 mg per tablet; Take 1 Tab by mouth every twelve (12) hours for 10 days. -     albuterol (PROVENTIL HFA, VENTOLIN HFA, PROAIR HFA) 90 mcg/actuation inhaler; Take 2 Puffs by inhalation every four (4) hours as needed for Wheezing for up to 360 days. Advised pt to take medications as written and follow up as needed  Subjective:       Prior to Admission medications    Medication Sig Start Date End Date Taking? Authorizing Provider   amoxicillin-clavulanate (AUGMENTIN) 875-125 mg per tablet Take 1 Tab by mouth every twelve (12) hours for 10 days. 3/1/21 3/11/21 Yes Ankit Arnold MD   albuterol (PROVENTIL HFA, VENTOLIN HFA, PROAIR HFA) 90 mcg/actuation inhaler Take 2 Puffs by inhalation every four (4) hours as needed for Wheezing for up to 360 days. 3/1/21 2/24/22 Yes Ankit Arnold MD   sirolimus (RAPAMUNE) 1 mg tablet TAKE 5 TABLETS BY MOUTH FIRST DAY, THEN 2 TABLETS DAILY 12/27/20  Yes Provider, Historical   pravastatin (PRAVACHOL) 20 mg tablet TAKE 1 TABLET NIGHTLY 9/24/20  Yes Ankit Arnold MD   azaTHIOprine (IMURAN) 50 mg tablet Take 25 mg by mouth daily.    Yes Provider, Historical   ergocalciferol (VITAMIN D2) 50,000 unit capsule Take 50,000 Units by mouth every seven (7) days. Yes Provider, Historical   predniSONE (DELTASONE) 5 mg tablet Take 2.5 mg by mouth daily. Yes Provider, Historical   pramipexole (MIRAPEX) 0.125 mg tablet Take 0.125 mg by mouth nightly. Yes Provider, Historical   gabapentin (NEURONTIN) 800 mg tablet Take 800 mg by mouth two (2) times a day. May take addition 1/2 - 1 tab in the afternoon as needed   Yes Provider, Historical   cyanocobalamin (VITAMIN B12) 1,000 mcg/mL injection 1,000 mcg by IntraMUSCular route every fourteen (14) days. Yes Provider, Historical     Patient Active Problem List   Diagnosis Code    Kidney replaced by transplant Z94.0    Hyperlipidemia E78.5    Peripheral neuropathy G62.9    Gout M10.9    GERD (gastroesophageal reflux disease) K21.9    Anemia D64.9    Hyperparathyroidism, secondary renal ([facility] N25.81    Skin lesion L98.9     Current Outpatient Medications   Medication Sig Dispense Refill    amoxicillin-clavulanate (AUGMENTIN) 875-125 mg per tablet Take 1 Tab by mouth every twelve (12) hours for 10 days. 20 Tab 0    albuterol (PROVENTIL HFA, VENTOLIN HFA, PROAIR HFA) 90 mcg/actuation inhaler Take 2 Puffs by inhalation every four (4) hours as needed for Wheezing for up to 360 days. 1 Inhaler 5    sirolimus (RAPAMUNE) 1 mg tablet TAKE 5 TABLETS BY MOUTH FIRST DAY, THEN 2 TABLETS DAILY      pravastatin (PRAVACHOL) 20 mg tablet TAKE 1 TABLET NIGHTLY 90 Tab 1    azaTHIOprine (IMURAN) 50 mg tablet Take 25 mg by mouth daily.  ergocalciferol (VITAMIN D2) 50,000 unit capsule Take 50,000 Units by mouth every seven (7) days.  predniSONE (DELTASONE) 5 mg tablet Take 2.5 mg by mouth daily.  pramipexole (MIRAPEX) 0.125 mg tablet Take 0.125 mg by mouth nightly.  gabapentin (NEURONTIN) 800 mg tablet Take 800 mg by mouth two (2) times a day.  May take addition 1/2 - 1 tab in the afternoon as needed      cyanocobalamin (VITAMIN B12) 1,000 mcg/mL injection 1,000 mcg by IntraMUSCular route every fourteen (14) days. Allergies   Allergen Reactions    Morphine Anaphylaxis       Review of Systems   Constitutional: Negative for chills, fever and malaise/fatigue. HENT: Negative for congestion and sore throat. Respiratory: Positive for cough. Negative for shortness of breath. Cardiovascular: Negative for chest pain and palpitations. Gastrointestinal: Negative for abdominal pain, heartburn, nausea and vomiting. Genitourinary: Negative for dysuria and urgency. Musculoskeletal: Negative for joint pain and myalgias. Neurological: Negative for dizziness, tingling and headaches. All other systems reviewed and are negative.       Objective:     Patient-Reported Vitals 1/4/2021   Patient-Reported Weight 171 lbs   Patient-Reported Height -   Patient-Reported Pulse -   Patient-Reported Systolic  045   Patient-Reported Diastolic 70        [INSTRUCTIONS:  \"[x]\" Indicates a positive item  \"[]\" Indicates a negative item  -- DELETE ALL ITEMS NOT EXAMINED]    Constitutional: [x] Appears well-developed and well-nourished [x] No apparent distress      [] Abnormal -     Mental status: [x] Alert and awake  [x] Oriented to person/place/time [x] Able to follow commands    [] Abnormal -     Eyes:   EOM    [x]  Normal    [] Abnormal -   Sclera  [x]  Normal    [] Abnormal -          Discharge [x]  None visible   [] Abnormal -     HENT: [x] Normocephalic, atraumatic  [] Abnormal -   [x] Mouth/Throat: Mucous membranes are moist    External Ears [x] Normal  [] Abnormal -    Neck: [x] No visualized mass [] Abnormal -     Pulmonary/Chest: [x] Respiratory effort normal   [x] No visualized signs of difficulty breathing or respiratory distress        [] Abnormal -      Musculoskeletal:   [x] Normal gait with no signs of ataxia         [x] Normal range of motion of neck        [] Abnormal -     Neurological:        [x] No Facial Asymmetry (Cranial nerve 7 motor function) (limited exam due to video visit)          [x] No gaze palsy        [] Abnormal -          Skin:        [x] No significant exanthematous lesions or discoloration noted on facial skin         [] Abnormal -            Psychiatric:       [x] Normal Affect [] Abnormal -        [x] No Hallucinations    Other pertinent observable physical exam findings:-        We discussed the expected course, resolution and complications of the diagnosis(es) in detail. Medication risks, benefits, costs, interactions, and alternatives were discussed as indicated. I advised her to contact the office if her condition worsens, changes or fails to improve as anticipated. She expressed understanding with the diagnosis(es) and plan. Jovani Bunch, was evaluated through a synchronous (real-time) audio-video encounter. The patient (or guardian if applicable) is aware that this is a billable service. Verbal consent to proceed has been obtained within the past 12 months. The visit was conducted pursuant to the emergency declaration under the [address] [place] [address] authority and the [employer] General Act. Patient identification was verified, and a caregiver was present when appropriate. The patient was located in a state where the provider was credentialed to provide care.       Shelley Bower MD

## 2021-03-15 RX ORDER — PRAVASTATIN SODIUM 20 MG/1
TABLET ORAL
Qty: 90 TAB | Refills: 1 | Status: SHIPPED | OUTPATIENT
Start: 2021-03-15 | End: 2021-09-07

## 2021-07-30 LAB — COLONOSCOPY, EXTERNAL: NORMAL

## 2021-08-19 ENCOUNTER — OFFICE VISIT (OUTPATIENT)
Dept: FAMILY MEDICINE CLINIC | Age: 61
End: 2021-08-19
Payer: COMMERCIAL

## 2021-08-19 VITALS
HEART RATE: 70 BPM | TEMPERATURE: 98.3 F | HEIGHT: 67 IN | DIASTOLIC BLOOD PRESSURE: 74 MMHG | RESPIRATION RATE: 16 BRPM | WEIGHT: 164.8 LBS | BODY MASS INDEX: 25.87 KG/M2 | SYSTOLIC BLOOD PRESSURE: 117 MMHG | OXYGEN SATURATION: 97 %

## 2021-08-19 DIAGNOSIS — Z00.00 MEDICARE ANNUAL WELLNESS VISIT, SUBSEQUENT: ICD-10-CM

## 2021-08-19 DIAGNOSIS — N25.81 HYPERPARATHYROIDISM, SECONDARY RENAL (HCC): ICD-10-CM

## 2021-08-19 DIAGNOSIS — R79.9 ABNORMAL FINDING OF BLOOD CHEMISTRY, UNSPECIFIED: ICD-10-CM

## 2021-08-19 DIAGNOSIS — E55.9 VITAMIN D DEFICIENCY, UNSPECIFIED: ICD-10-CM

## 2021-08-19 DIAGNOSIS — Z94.0 KIDNEY REPLACED BY TRANSPLANT: ICD-10-CM

## 2021-08-19 DIAGNOSIS — Z12.31 OTHER SCREENING MAMMOGRAM: Primary | ICD-10-CM

## 2021-08-19 DIAGNOSIS — E78.2 MIXED HYPERLIPIDEMIA: ICD-10-CM

## 2021-08-19 PROCEDURE — 99213 OFFICE O/P EST LOW 20 MIN: CPT | Performed by: FAMILY MEDICINE

## 2021-08-19 PROCEDURE — G8419 CALC BMI OUT NRM PARAM NOF/U: HCPCS | Performed by: FAMILY MEDICINE

## 2021-08-19 PROCEDURE — 3017F COLORECTAL CA SCREEN DOC REV: CPT | Performed by: FAMILY MEDICINE

## 2021-08-19 PROCEDURE — G8427 DOCREV CUR MEDS BY ELIG CLIN: HCPCS | Performed by: FAMILY MEDICINE

## 2021-08-19 PROCEDURE — G0463 HOSPITAL OUTPT CLINIC VISIT: HCPCS | Performed by: FAMILY MEDICINE

## 2021-08-19 PROCEDURE — G0439 PPPS, SUBSEQ VISIT: HCPCS | Performed by: FAMILY MEDICINE

## 2021-08-19 PROCEDURE — G8510 SCR DEP NEG, NO PLAN REQD: HCPCS | Performed by: FAMILY MEDICINE

## 2021-08-19 PROCEDURE — G9899 SCRN MAM PERF RSLTS DOC: HCPCS | Performed by: FAMILY MEDICINE

## 2021-08-19 NOTE — PATIENT INSTRUCTIONS

## 2021-08-19 NOTE — PROGRESS NOTES
1. Have you been to the ER, urgent care clinic since your last visit? Hospitalized since your last visit? No    2. Have you seen or consulted any other health care providers outside of the [address] [place] [address] since your last visit? Include any pap smears or colon screening. Yes, pt states she had a colonoscopy.     Health Maintenance Due   Topic Date Due    Pneumococcal 0-64 years (1 of 4 - PCV13) Never done    COVID-19 Vaccine (1) Never done    Breast Cancer Screen Mammogram  11/30/2019    Colorectal Cancer Screening Combo  06/01/2020    Medicare Yearly Exam  07/16/2021     Chief Complaint   Patient presents with   [facility] Annual Wellness Visit

## 2021-08-19 NOTE — PROGRESS NOTES
Chief Complaint   Patient presents with    Annual Wellness Visit     Pt had a recent colonoscopy. Pt was diagnosed with diverticulosis. Pt had a recent wasp sting on her ankle doing yard work. Subjective: (As above and below)     Chief Complaint   Patient presents with    Annual Wellness Visit     she is a 63y.o. year old female who presents for evaluation. Reviewed PmHx, RxHx, FmHx, SocHx, AllgHx and updated in chart. Review of Systems - negative except as listed above    Objective:     Vitals:    08/19/21 1032   BP: 117/74   Pulse: 70   Resp: 16   Temp: 98.3 °F (36.8 °C)   TempSrc: Oral   SpO2: 97%   Weight: 164 lb 12.8 oz (74.8 kg)   Height: 5' 7\" (1.702 m)     Physical Examination: General appearance - alert, well appearing, and in no distress  Mental status - normal mood, behavior, speech, dress, motor activity, and thought processes  Ears - bilateral TM's and external ear canals normal  Chest - clear to auscultation, no wheezes, rales or rhonchi, symmetric air entry  Heart - normal rate, regular rhythm, normal S1, S2, no murmurs, rubs, clicks or gallops  Musculoskeletal - no joint tenderness, deformity or swelling  Extremities - peripheral pulses normal, no pedal edema, no clubbing or cyanosis    Assessment/ Plan:   1. Hyperparathyroidism, secondary renal ([address]  -check labs  - METABOLIC PANEL, COMPREHENSIVE; Future    2. Other screening mammogram  - BALDEMAR MAMMO BI SCREENING INCL CAD; Future    3. Kidney replaced by transplant  -followed by specialist    4. Mixed hyperlipidemia  - METABOLIC PANEL, COMPREHENSIVE; Future  - LIPID PANEL; Future    5. Vitamin D deficiency, unspecified  - VITAMIN D, 25 HYDROXY; Future    6. Abnormal finding of blood chemistry, unspecified  - CBC W/O DIFF; Future  - HEMOGLOBIN A1C WITH EAG; Future    7. Medicare annual wellness visit, subsequent  -see below       I have discussed the diagnosis with the patient and the intended plan as seen in the above orders.   The patient has received an after-visit summary and questions were answered concerning future plans. Medication Side Effects and Warnings were discussed with patient: yes  Patient Labs were reviewed: yes  Patient Past Records were reviewed:  yes    Elwanda Runner, M.D. This is the Subsequent Medicare Annual Wellness Exam, performed 12 months or more after the Initial AWV or the last Subsequent AWV    I have reviewed the patient's medical history in detail and updated the computerized patient record. Assessment/Plan   Education and counseling provided:  Are appropriate based on today's review and evaluation    1. Other screening mammogram  -     BALDEMAR MAMMO BI SCREENING INCL CAD; Future  2. Hyperparathyroidism, secondary renal (HCC)  -     METABOLIC PANEL, COMPREHENSIVE; Future  3. Kidney replaced by transplant  4. Mixed hyperlipidemia  -     METABOLIC PANEL, COMPREHENSIVE; Future  -     LIPID PANEL; Future  5. Vitamin D deficiency, unspecified  -     VITAMIN D, 25 HYDROXY; Future  6. Abnormal finding of blood chemistry, unspecified  -     CBC W/O DIFF; Future  -     HEMOGLOBIN A1C WITH EAG; Future  7. Medicare annual wellness visit, subsequent       Depression Risk Factor Screening     3 most recent PHQ Screens 8/19/2021   Little interest or pleasure in doing things Not at all   Feeling down, depressed, irritable, or hopeless Not at all   Total Score PHQ 2 0       Alcohol Risk Screen    Do you average more than 1 drink per night or more than 7 drinks a week:  No    On any one occasion in the past three months have you have had more than 3 drinks containing alcohol:  No        Functional Ability and Level of Safety    Hearing: Hearing is good. Activities of Daily Living: The home contains: handrails and grab bars  Patient does total self care      Ambulation: with difficulty, uses a cane     Fall Risk:  Fall Risk Assessment, last 12 mths 1/24/2019   Able to walk? Yes   Fall in past 12 months?  Yes Number of falls in past 12 months 1   Fall with injury? 1      Abuse Screen:  Patient is not abused       Cognitive Screening    Has your family/caregiver stated any concerns about your memory: no     Health Maintenance Due     Health Maintenance Due   Topic Date Due    Pneumococcal 0-64 years (1 of 4 - PCV13) Never done    Breast Cancer Screen Mammogram  11/30/2019       Patient Care Team   Patient Care Team:  Sundeep Herrera MD as PCP - General (Family Medicine)  Ricardo Arnold MD as PCP - [facility] Empaneled Provider    History     Patient Active Problem List   Diagnosis Code    Kidney replaced by transplant Z94.0    Hyperlipidemia E78.5    Peripheral neuropathy G62.9    Gout M10.9    GERD (gastroesophageal reflux disease) K21.9    Anemia D64.9    Hyperparathyroidism, secondary renal ([address] N25.81    Skin lesion L98.9    Restless legs syndrome G25.81     Past Medical History:   Diagnosis Date    Abnormal CT scan of lung 11/16/2018    Scarring per pulmonary, not malignant    Arthritis     Basal cell carcinoma in situ of skin     removed from left cheek and left arm    Chronic kidney disease 1990    Renal Transplant    Diverticulitis     Hip pain     dr. Edi Mcdermott    Hyperlipidemia     [facility] ([facility]     Kidney replaced by transplant 1990    unknown etiology, Dr. Sandra Ramirez Peripheral neuropathy       Past Surgical History:   Procedure Laterality Date    HX APPENDECTOMY      HX CHOLECYSTECTOMY      HX HYSTERECTOMY      HX PREMALIG/BENIGN SKIN LESION EXCISION  2017    HX TRANSPLANT  1990    kidney     Current Outpatient Medications   Medication Sig Dispense Refill    pravastatin (PRAVACHOL) 20 mg tablet TAKE 1 TABLET BY MOUTH EVERY DAY AT NIGHT 90 Tab 1    azaTHIOprine (IMURAN) 50 mg tablet Take 25 mg by mouth daily.  ergocalciferol (VITAMIN D2) 50,000 unit capsule Take 50,000 Units by mouth every seven (7) days.       predniSONE (DELTASONE) 5 mg tablet Take 2.5 mg by mouth daily.  pramipexole (MIRAPEX) 0.125 mg tablet Take 0.125 mg by mouth nightly.  gabapentin (NEURONTIN) 800 mg tablet Take 800 mg by mouth two (2) times a day. May take addition 1/2 - 1 tab in the afternoon as needed      cyanocobalamin (VITAMIN B12) 1,000 mcg/mL injection 1,000 mcg by IntraMUSCular route every fourteen (14) days.        Allergies   Allergen Reactions    Morphine Anaphylaxis       Family History   Problem Relation Age of Onset    Cancer Mother         breast    SLE Mother     Breast Cancer Mother         35s    Emphysema Father     Breast Cancer Maternal Grandmother         late 62s     Social History     Tobacco Use    Smoking status: Never Smoker    Smokeless tobacco: Never Used   Substance Use Topics    Alcohol use: No     Alcohol/week: 0.0 standard drinks         Irma Perez MD

## 2021-08-20 LAB
25(OH)D3 SERPL-MCNC: 40.3 NG/ML (ref 30–100)
ALBUMIN SERPL-MCNC: 3.7 G/DL (ref 3.5–5)
ALBUMIN/GLOB SERPL: 1 {RATIO} (ref 1.1–2.2)
ALP SERPL-CCNC: 112 U/L (ref 45–117)
ALT SERPL-CCNC: 34 U/L (ref 12–78)
ANION GAP SERPL CALC-SCNC: 4 MMOL/L (ref 5–15)
AST SERPL-CCNC: 27 U/L (ref 15–37)
BILIRUB SERPL-MCNC: 0.3 MG/DL (ref 0.2–1)
BUN SERPL-MCNC: 26 MG/DL (ref 6–20)
BUN/CREAT SERPL: 15 (ref 12–20)
CALCIUM SERPL-MCNC: 9.2 MG/DL (ref 8.5–10.1)
CHLORIDE SERPL-SCNC: 110 MMOL/L (ref 97–108)
CHOLEST SERPL-MCNC: 289 MG/DL
CO2 SERPL-SCNC: 28 MMOL/L (ref 21–32)
CREAT SERPL-MCNC: 1.75 MG/DL (ref 0.55–1.02)
ERYTHROCYTE [DISTWIDTH] IN BLOOD BY AUTOMATED COUNT: 16.1 % (ref 11.5–14.5)
EST. AVERAGE GLUCOSE BLD GHB EST-MCNC: 128 MG/DL
GLOBULIN SER CALC-MCNC: 3.6 G/DL (ref 2–4)
GLUCOSE SERPL-MCNC: 96 MG/DL (ref 65–100)
HBA1C MFR BLD: 6.1 % (ref 4–5.6)
HCT VFR BLD AUTO: 34.9 % (ref 35–47)
HDLC SERPL-MCNC: 47 MG/DL
HDLC SERPL: 6.1 {RATIO} (ref 0–5)
HGB BLD-MCNC: 10.5 G/DL (ref 11.5–16)
LDLC SERPL CALC-MCNC: ABNORMAL MG/DL (ref 0–100)
LDLC SERPL DIRECT ASSAY-MCNC: 164 MG/DL (ref 0–100)
MCH RBC QN AUTO: 24.8 PG (ref 26–34)
MCHC RBC AUTO-ENTMCNC: 30.1 G/DL (ref 30–36.5)
MCV RBC AUTO: 82.5 FL (ref 80–99)
NRBC # BLD: 0 K/UL (ref 0–0.01)
NRBC BLD-RTO: 0 PER 100 WBC
PLATELET # BLD AUTO: 339 K/UL (ref 150–400)
PMV BLD AUTO: 11.1 FL (ref 8.9–12.9)
POTASSIUM SERPL-SCNC: 4.5 MMOL/L (ref 3.5–5.1)
PROT SERPL-MCNC: 7.3 G/DL (ref 6.4–8.2)
RBC # BLD AUTO: 4.23 M/UL (ref 3.8–5.2)
SODIUM SERPL-SCNC: 142 MMOL/L (ref 136–145)
TRIGL SERPL-MCNC: 410 MG/DL (ref ?–150)
VLDLC SERPL CALC-MCNC: ABNORMAL MG/DL
WBC # BLD AUTO: 10 K/UL (ref 3.6–11)

## 2021-08-20 NOTE — PROGRESS NOTES
Increase in risk for diabetes, please closely monitor diet and increase exercise   Your triglycerides are elevated. The best way to bring that number down is to incorporate more omega 3's into your diet. Here are some suggestions on how to do that:  - eat 2-3 serving of fish like salmon and trout per week  - eat lots of fresh dark leafy green vegetables  - incorporate more garlic into your diet  Hemoglobin has dropped slightly. Recheck in 4 weeks. Kidney function stable, but elevated. Cholesterol remains significantly elevated, please confirm that pt is taking cholesterol medication daily. All other labs are within normal limits.    Please inform

## 2021-08-24 NOTE — PROGRESS NOTES
Pt has been informed and verified understanding. Pt states she is taking her cholesterol medication daily.

## 2021-09-07 RX ORDER — PRAVASTATIN SODIUM 20 MG/1
TABLET ORAL
Qty: 90 TABLET | Refills: 1 | Status: SHIPPED | OUTPATIENT
Start: 2021-09-07 | End: 2022-01-10

## 2021-09-24 ENCOUNTER — HOSPITAL ENCOUNTER (OUTPATIENT)
Dept: MAMMOGRAPHY | Age: 61
Discharge: HOME | End: 2021-09-24
Attending: FAMILY MEDICINE
Payer: COMMERCIAL

## 2021-09-24 DIAGNOSIS — Z12.31 OTHER SCREENING MAMMOGRAM: ICD-10-CM

## 2021-09-24 PROCEDURE — 77067 SCR MAMMO BI INCL CAD: CPT

## 2022-01-10 RX ORDER — PRAVASTATIN SODIUM 20 MG/1
TABLET ORAL
Qty: 90 TABLET | Refills: 1 | Status: SHIPPED | OUTPATIENT
Start: 2022-01-10

## 2022-03-18 PROBLEM — L98.9 SKIN LESION: Status: ACTIVE | Noted: 2018-11-16

## 2022-10-12 ENCOUNTER — TRANSCRIBE ORDER (OUTPATIENT)
Dept: SCHEDULING | Age: 62
End: 2022-10-12

## 2022-10-12 DIAGNOSIS — Z12.31 SCREENING MAMMOGRAM FOR HIGH-RISK PATIENT: Primary | ICD-10-CM

## 2022-12-05 ENCOUNTER — HOSPITAL ENCOUNTER (OUTPATIENT)
Dept: MAMMOGRAPHY | Age: 62
Discharge: HOME | End: 2022-12-05
Attending: FAMILY MEDICINE
Payer: COMMERCIAL

## 2022-12-05 DIAGNOSIS — Z12.31 SCREENING MAMMOGRAM FOR HIGH-RISK PATIENT: ICD-10-CM

## 2022-12-05 PROCEDURE — 77067 SCR MAMMO BI INCL CAD: CPT

## 2023-01-30 ENCOUNTER — OFFICE VISIT (OUTPATIENT)
Dept: FAMILY MEDICINE CLINIC | Age: 63
End: 2023-01-30
Payer: COMMERCIAL

## 2023-01-30 VITALS
WEIGHT: 171 LBS | HEART RATE: 71 BPM | DIASTOLIC BLOOD PRESSURE: 81 MMHG | TEMPERATURE: 98.2 F | RESPIRATION RATE: 18 BRPM | OXYGEN SATURATION: 97 % | HEIGHT: 67 IN | BODY MASS INDEX: 26.84 KG/M2 | SYSTOLIC BLOOD PRESSURE: 119 MMHG

## 2023-01-30 DIAGNOSIS — Z23 NEEDS FLU SHOT: ICD-10-CM

## 2023-01-30 DIAGNOSIS — Z00.00 MEDICARE ANNUAL WELLNESS VISIT, SUBSEQUENT: ICD-10-CM

## 2023-01-30 DIAGNOSIS — E78.2 MIXED HYPERLIPIDEMIA: Primary | ICD-10-CM

## 2023-01-30 DIAGNOSIS — N25.81 HYPERPARATHYROIDISM, SECONDARY RENAL (HCC): ICD-10-CM

## 2023-01-30 DIAGNOSIS — E55.9 VITAMIN D DEFICIENCY, UNSPECIFIED: ICD-10-CM

## 2023-01-30 DIAGNOSIS — R68.89 OTHER GENERAL SYMPTOMS AND SIGNS: ICD-10-CM

## 2023-01-30 DIAGNOSIS — K21.9 GASTROESOPHAGEAL REFLUX DISEASE WITHOUT ESOPHAGITIS: ICD-10-CM

## 2023-01-30 DIAGNOSIS — R79.9 ABNORMAL FINDING OF BLOOD CHEMISTRY, UNSPECIFIED: ICD-10-CM

## 2023-01-30 DIAGNOSIS — Z94.0 KIDNEY REPLACED BY TRANSPLANT: ICD-10-CM

## 2023-01-30 PROCEDURE — G0463 HOSPITAL OUTPT CLINIC VISIT: HCPCS | Performed by: FAMILY MEDICINE

## 2023-01-30 PROCEDURE — 90686 IIV4 VACC NO PRSV 0.5 ML IM: CPT | Performed by: FAMILY MEDICINE

## 2023-01-30 RX ORDER — FAMOTIDINE 10 MG/1
10 TABLET ORAL 2 TIMES DAILY
COMMUNITY

## 2023-01-30 RX ORDER — PANTOPRAZOLE SODIUM 40 MG/1
40 TABLET, DELAYED RELEASE ORAL DAILY
Qty: 30 TABLET | Refills: 1 | Status: SHIPPED | OUTPATIENT
Start: 2023-01-30

## 2023-01-30 NOTE — PROGRESS NOTES
Room:  Identified pt with two pt identifiers(name and ). Reviewed record in preparation for visit and have obtained necessary documentation. Chief Complaint   Patient presents with    Physical    GERD      Patient presents for physical, and is having recent gastric reflux type pain when she lays down and causes issues at night, reports taking over the counter famotidine to little effect. Vitals:    23 0749   BP: (!) 145/81   Pulse: 71   Resp: 18   Temp: 98.2 °F (36.8 °C)   TempSrc: Oral   SpO2: 97%   Weight: 171 lb (77.6 kg)   Height: 5' 7\" (1.702 m)   PainSc:   0 - No pain       Health Maintenance Due   Topic    Shingles Vaccine (2 of 2)    COVID-19 Vaccine (3 - Moderna risk series)    Flu Vaccine (1)    Depression Screen     Lipid Screen     Medicare Yearly Exam        1. \"Have you been to the ER, urgent care clinic since your last visit? Hospitalized since your last visit? \" No    2. \"Have you seen or consulted any other health care providers outside of the [address] [place] [address] since your last visit? \" No     3. For patients over 45: Has the patient had a colonoscopy? Yes - no Care Gap present     If the patient is female:    4. For patients over 40: Has the patient had a mammogram? Yes - no Care Gap present    5. For patients over 21: Has the patient had a pap smear?  Yes - no Care Gap present    Current Outpatient Medications   Medication Instructions    cyanocobalamin (VITAMIN B12) 1,000 mcg, EVERY 14 DAYS    ergocalciferol (ERGOCALCIFEROL) 50,000 Units, Oral, EVERY 7 DAYS    famotidine (PEPCID) 10 mg, Oral, 2 TIMES DAILY    gabapentin (NEURONTIN) 800 mg, Oral, 2 TIMES DAILY, May take addition 1/2 - 1 tab in the afternoon as needed    pramipexole (MIRAPEX) 0.125 mg, EVERY BEDTIME    pravastatin (PRAVACHOL) 20 mg tablet TAKE 1 TABLET BY MOUTH EVERY DAY AT NIGHT    predniSONE (DELTASONE) 2.5 mg, Oral, DAILY       Allergies   Allergen Reactions    Morphine Anaphylaxis and Other (comments) Reaction Type: Allergy;  Severity: High; Reaction(s): Respiratory depression       Immunization History   Administered Date(s) Administered    (RETIRED) Pneumococcal Vaccine (Unspecified Type) 10/03/2012    COVID-19, MODERNA BLUE border, Primary or Immunocompromised, (age 18y+), IM, 100 mcg/0.5mL 04/02/2021, 04/30/2021    H1N1 Influenza Virus Vaccine 02/09/2010    Influenza Vaccine 10/09/2013, 09/11/2019    Influenza Vaccine Split 10/25/2011, 10/03/2012    Influenza, AFLURIA (age 10-32 mo), IM, MDV, 0.25 mL, Fluzone (age 10 mo+), AFLURIA (age 1 y+), IM, MDV, 0.5mL 11/07/2014, 09/27/2016    Influenza, FLUARIX, FLULAVAL, Simi Aisha (age 10 mo+) AND AFLURIA, (age 1 y+), PF, 0.5mL 10/27/2015, 10/06/2017, 11/16/2018, 09/11/2019    Influenza, FLUCELVAX, (age 10 mo+), MDCK, PF 09/24/2020    Tdap 10/27/2015    Zoster Recombinant 09/11/2019       Past Medical History:   Diagnosis Date    Abnormal CT scan of lung 11/16/2018    Scarring per pulmonary, not malignant    Arthritis     Basal cell carcinoma in situ of skin     removed from left cheek and left arm    Calculus of kidney 2018    Cancer ([facility] 2000    Chronic kidney disease 1990    Renal Transplant    Chronic pain 1995    Diverticulitis     GERD (gastroesophageal reflux disease) 2000    Hip pain     dr. Bette Anguiano    Hyperlipidemia     Hyperparathyroidism [facility])     Kidney replaced by transplant 1990    unknown etiology, Dr. Leonarda Villegas    Peripheral neuropathy

## 2023-01-30 NOTE — PATIENT INSTRUCTIONS
Vaccine Information Statement    Influenza (Flu) Vaccine (Inactivated or Recombinant): What You Need to Know    Many vaccine information statements are available in Wolof and other languages. See www.immunize.org/vis. Hojas de información sobre vacunas están disponibles en español y en muchos otros idiomas. Visite www.immunize.org/vis. 1. Why get vaccinated? Influenza vaccine can prevent influenza (flu). Flu is a contagious disease that spreads around the [facility] every year, usually between October and May. Anyone can get the flu, but it is more dangerous for some people. Infants and young children, people 72 years and older, pregnant people, and people with certain health conditions or a weakened immune system are at greatest risk of flu complications. Pneumonia, bronchitis, sinus infections, and ear infections are examples of flu-related complications. If you have a medical condition, such as heart disease, cancer, or diabetes, flu can make it worse. Flu can cause fever and chills, sore throat, muscle aches, fatigue, cough, headache, and runny or stuffy nose. Some people may have vomiting and diarrhea, though this is more common in children than adults. In an average year, thousands of people in the [facility] die from flu, and many more are hospitalized. Flu vaccine prevents millions of illnesses and flu-related visits to the doctor each year. 2. Influenza vaccines     CDC recommends everyone 6 months and older get vaccinated every flu season. Children 6 months through 6years of age may need 2 doses during a single flu season. Everyone else needs only 1 dose each flu season. It takes about 2 weeks for protection to develop after vaccination. There are many flu viruses, and they are always changing. Each year a new flu vaccine is made to protect against the influenza viruses believed to be likely to cause disease in the upcoming flu season.  Even when the vaccine doesnt exactly match these viruses, it may still provide some protection. Influenza vaccine does not cause flu. Influenza vaccine may be given at the same time as other vaccines. 3. Talk with your health care provider    Tell your vaccination provider if the person getting the vaccine:  Has had an allergic reaction after a previous dose of influenza vaccine, or has any severe, life-threatening allergies   Has ever had Guillain-Barré Syndrome (also called GBS)    In some cases, your health care provider may decide to postpone influenza vaccination until a future visit. Influenza vaccine can be administered at any time during pregnancy. People who are or will be pregnant during influenza season should receive inactivated influenza vaccine. People with minor illnesses, such as a cold, may be vaccinated. People who are moderately or severely ill should usually wait until they recover before getting influenza vaccine. Your health care provider can give you more information. 4. Risks of a vaccine reaction    Soreness, redness, and swelling where the shot is given, fever, muscle aches, and headache can happen after influenza vaccination. There may be a very small increased risk of Guillain-Barré Syndrome (GBS) after inactivated influenza vaccine (the flu shot). Janey Galloway children who get the flu shot along with pneumococcal vaccine (PCV13) and/or DTaP vaccine at the same time might be slightly more likely to have a seizure caused by fever. Tell your health care provider if a child who is getting flu vaccine has ever had a seizure. People sometimes faint after medical procedures, including vaccination. Tell your provider if you feel dizzy or have vision changes or ringing in the ears. As with any medicine, there is a very remote chance of a vaccine causing a severe allergic reaction, other serious injury, or death. 5. What if there is a serious problem?     An allergic reaction could occur after the vaccinated person leaves the clinic. If you see signs of a severe allergic reaction (hives, swelling of the face and throat, difficulty breathing, a fast heartbeat, dizziness, or weakness), call 9-1-1 and get the person to the nearest hospital.    For other signs that concern you, call your health care provider. Adverse reactions should be reported to the Vaccine Adverse Event Reporting System (VAERS). Your health care provider will usually file this report, or you can do it yourself. Visit the VAERS website at www.vaers. [facility].gov or call 0-772.199.8315. VAERS is only for reporting reactions, and VAERS staff members do not give medical advice. 6. The National Vaccine Injury Compensation Program    The [facility] Vaccine Injury Compensation Program (VICP) is a federal program that was created to compensate people who may have been injured by certain vaccines. Claims regarding alleged injury or death due to vaccination have a time limit for filing, which may be as short as two years. Visit the VICP website at www.[facility].gov/vaccinecompensation or call 8-133.232.2457 to learn about the program and about filing a claim. 7. How can I learn more? Ask your health care provider. Call your local or state health department. Visit the website of the Food and Drug Administration (FDA) for vaccine package inserts and additional information at www.fda.gov/vaccines-blood-biologics/vaccines. Contact the Centers for Disease Control and Prevention (CDC): Call 5-470.613.9119 (2-910-OWP-INFO) or  Visit CDCs influenza website at www.cdc.gov/flu. Vaccine Information Statement   Inactivated Influenza Vaccine   8/6/2021  42 LINCOLN Alfonso [id number]   Department of Health and Human Services  Centers for Disease Control and Prevention    Office Use Only      Medicare Wellness Visit, Female     The best way to live healthy is to have a lifestyle where you eat a well-balanced diet, exercise regularly, limit alcohol use, and quit all forms of tobacco/nicotine, if applicable. Regular preventive services are another way to keep healthy. Preventive services (vaccines, screening tests, monitoring & exams) can help personalize your care plan, which helps you manage your own care. Screening tests can find health problems at the earliest stages, when they are easiest to treat. [facility] follows the current, evidence-based guidelines published by the [facility]ie Rasmussen ([facility]) when recommending preventive services for our patients. Because we follow these guidelines, sometimes recommendations change over time as research supports it. (For example, mammograms used to be recommended annually. Even though Medicare will still pay for an annual mammogram, the newer guidelines recommend a mammogram every two years for women of average risk). Of course, you and your doctor may decide to screen more often for some diseases, based on your risk and your co-morbidities (chronic disease you are already diagnosed with). Preventive services for you include:  - Medicare offers their members a free annual wellness visit, which is time for you and your primary care provider to discuss and plan for your preventive service needs.  Take advantage of this benefit every year!    -Over the age of 72 should receive the recommended pneumonia vaccines.    -All adults should have a flu vaccine yearly.  -All adults should have a tetanus vaccine every 10 years.   -Over the age 48 should receive the shingles vaccines.        -All adults should be screened once for Hepatitis C.  -All adults age 38-68 who are overweight should have a diabetes screening test once every three years.   -Other screening tests and preventive services for persons with diabetes include: an eye exam to screen for diabetic retinopathy, a kidney function test, a foot exam, and stricter control over your cholesterol.   -Cardiovascular screening for adults with routine risk involves an electrocardiogram (ECG) at intervals determined by your doctor.     -Colorectal cancer screenings should be done for adults age 39-70 with no increased risk factors for colorectal cancer. There are a number of acceptable methods of screening for this type of cancer. Each test has its own benefits and drawbacks. Discuss with your doctor what is most appropriate for you during your annual wellness visit. The different tests include: colonoscopy (considered the best screening method), a fecal occult blood test, a fecal DNA test, and sigmoidoscopy.    -Lung cancer screening is recommended annually with a low dose CT scan for adults between age 54 and 68, who have smoked at least 30 pack years (equivalent of 1 pack per day for 30 days), and who is a current smoker or quit less than 15 years ago.    -A bone mass density test is recommended when a woman turns 65 to screen for osteoporosis. This test is only recommended one time, as a screening. Some providers will use this same test as a disease monitoring tool if you already have osteoporosis. -Breast cancer screenings are recommended every other year for women of normal risk, age 54-69.    -Cervical cancer screenings for women over age 72 are only recommended with certain risk factors.      Here is a list of your current Health Maintenance items (your personalized list of preventive services) with a due date:  Health Maintenance Due   Topic Date Due    COVID-19 Vaccine (3 - Moderna risk series) 05/28/2021    Yearly Flu Vaccine (1) 08/01/2022    Cholesterol Test   08/19/2022

## 2023-01-30 NOTE — PROGRESS NOTES
Chief Complaint   Patient presents with    Physical    GERD     Patient presents for physical, and is having recent gastric reflux type pain when she lays down and causes issues at night, reports taking over the counter famotidine to little effect. Pt is also taking tums regularly. Subjective: (As above and below)     Chief Complaint   Patient presents with    Physical    GERD     she is a 63y.o. year old female who presents for evaluation. Reviewed PmHx, RxHx, FmHx, SocHx, AllgHx and updated in chart. Review of Systems - negative except as listed above    Objective:     Vitals:    01/30/23 0749 01/30/23 0757   BP: (!) 145/81 119/81   Pulse: 71    Resp: 18    Temp: 98.2 °F (36.8 °C)    TempSrc: Oral    SpO2: 97%    Weight: 171 lb (77.6 kg)    Height: 5' 7\" (1.702 m)      Physical Examination: General appearance - alert, well appearing, and in no distress  Mental status - normal mood, behavior, speech, dress, motor activity, and thought processes  Ears - bilateral TM's and external ear canals normal  Chest - clear to auscultation, no wheezes, rales or rhonchi, symmetric air entry  Heart - normal rate, regular rhythm, normal S1, S2, no murmurs, rubs, clicks or gallops  Musculoskeletal - no joint tenderness, deformity or swelling  Extremities - peripheral pulses normal, no pedal edema, no clubbing or cyanosis    Assessment/ Plan:   1. Hyperparathyroidism, secondary renal ([address]  -check labs  - METABOLIC PANEL, COMPREHENSIVE; Future  - TSH 3RD GENERATION; Future    2. Mixed hyperlipidemia  -pt has lost weight, doing well  - METABOLIC PANEL, COMPREHENSIVE; Future  - LIPID PANEL; Future    3. Kidney replaced by transplant  -followed by Amador Mabry  - CBC W/O DIFF; Future    4. Vitamin D deficiency, unspecified  - VITAMIN D, 25 HYDROXY; Future    5. Abnormal finding of blood chemistry, unspecified  - HEMOGLOBIN A1C WITH EAG; Future    6.  Needs flu shot  - INFLUENZA, FLUARIX, FLULAVAL, FLUZONE (AGE 6 MO+), AFLURIA(AGE 3Y+) IM, PF, 0.5 ML    7. Medicare annual wellness visit, subsequent  -see below    8. Other general symptoms and signs   - TSH 3RD GENERATION; Future    9. Gastroesophageal reflux disease without esophagitis  -start on medication as written  - pantoprazole (PROTONIX) 40 mg tablet; Take 1 Tablet by mouth daily. Dispense: 30 Tablet; Refill: 1       I have discussed the diagnosis with the patient and the intended plan as seen in the above orders. The patient has received an after-visit summary and questions were answered concerning future plans. Medication Side Effects and Warnings were discussed with patient: yes  Patient Labs were reviewed: yes  Patient Past Records were reviewed:  yes    Barbara Landin M.D. This is the Subsequent Medicare Annual Wellness Exam, performed 12 months or more after the Initial AWV or the last Subsequent AWV    I have reviewed the patient's medical history in detail and updated the computerized patient record. Assessment/Plan   Education and counseling provided:  Are appropriate based on today's review and evaluation    1. Mixed hyperlipidemia  -     METABOLIC PANEL, COMPREHENSIVE; Future  -     LIPID PANEL; Future  2. Hyperparathyroidism, secondary renal (HCC)  -     METABOLIC PANEL, COMPREHENSIVE; Future  -     TSH 3RD GENERATION; Future  3. Kidney replaced by transplant  -     CBC W/O DIFF; Future  4. Vitamin D deficiency, unspecified  -     VITAMIN D, 25 HYDROXY; Future  5. Abnormal finding of blood chemistry, unspecified  -     HEMOGLOBIN A1C WITH EAG; Future  6. Needs flu shot  -     INFLUENZA, FLUARIX, FLULAVAL, FLUZONE (AGE 6 MO+), AFLURIA(AGE 3Y+) IM, PF, 0.5 ML  7. Medicare annual wellness visit, subsequent  8. Other general symptoms and signs   -     TSH 3RD GENERATION; Future  9. Gastroesophageal reflux disease without esophagitis  -     pantoprazole (PROTONIX) 40 mg tablet; Take 1 Tablet by mouth daily. , Normal, Disp-30 Tablet, R-1       Depression Risk Factor Screening     3 most recent PHQ Screens 1/30/2023   Little interest or pleasure in doing things Not at all   Feeling down, depressed, irritable, or hopeless Not at all   Total Score PHQ 2 0       Alcohol & Drug Abuse Risk Screen    Do you average more than 1 drink per night or more than 7 drinks a week:  No    On any one occasion in the past three months have you have had more than 3 drinks containing alcohol:  No          Functional Ability and Level of Safety    Hearing: Hearing is good. She does have a ringing in one ear. Activities of Daily Living: The home contains: handrails and grab bars  Patient does total self care      Ambulation: with no difficulty and uses a cane at time for standing only     Fall Risk:  Fall Risk Assessment, last 12 mths 1/24/2019   Able to walk? Yes   Fall in past 12 months? Yes   Number of falls in past 12 months 1   Fall with injury?  1      Abuse Screen:  Patient is not abused       Cognitive Screening    Has your family/caregiver stated any concerns about your memory: no     Cognitive Screening: Normal - MMSE (Mini Mental Status Exam)    Health Maintenance Due     Health Maintenance Due   Topic Date Due    COVID-19 Vaccine (3 - Moderna risk series) 05/28/2021    Flu Vaccine (1) 08/01/2022    Lipid Screen  08/19/2022       Patient Care Team   Patient Care Team:  Sukhdeep Caballero MD as PCP - General (Family Medicine)  [facility] Empaneled Provider    History     Patient Active Problem List   Diagnosis Code    Kidney replaced by transplant Z94.0    Hyperlipidemia E78.5    Peripheral neuropathy G62.9    Gout M10.9    GERD (gastroesophageal reflux disease) K21.9    Anemia D64.9    Hyperparathyroidism, secondary renal ([facility] [address] N25.81    Skin lesion L98.9    Restless legs syndrome G25.81     Past Medical History:   Diagnosis Date    Abnormal CT scan of lung 11/16/2018    Scarring per pulmonary, not malignant    Arthritis     Basal cell carcinoma in situ of skin     removed from left cheek and left arm    Calculus of kidney 2018    Cancer [facility]) 2000    Chronic kidney disease 1990    Renal Transplant    Chronic pain 1995    Diverticulitis     GERD (gastroesophageal reflux disease) 2000    Hip pain     dr. Bette Anguiano    Hyperlipidemia     Hyperparathyroidism [facility])     Kidney replaced by transplant 1990    unknown etiology, Dr. Leonarda Villegas    Peripheral neuropathy       Past Surgical History:   Procedure Laterality Date    HX APPENDECTOMY      HX CHOLECYSTECTOMY      HX HYSTERECTOMY      HX PREMALIG/BENIGN SKIN LESION EXCISION  2017    HX TRANSPLANT  1990    kidney     Current Outpatient Medications   Medication Sig Dispense Refill    famotidine (PEPCID) 10 mg tablet Take 10 mg by mouth two (2) times a day. pravastatin (PRAVACHOL) 20 mg tablet TAKE 1 TABLET BY MOUTH EVERY DAY AT NIGHT 90 Tablet 0    ergocalciferol (ERGOCALCIFEROL) 1,250 mcg (50,000 unit) capsule Take 50,000 Units by mouth every seven (7) days. predniSONE (DELTASONE) 5 mg tablet Take 2.5 mg by mouth daily. pramipexole (MIRAPEX) 0.125 mg tablet Take 0.125 mg by mouth nightly.      gabapentin (NEURONTIN) 800 mg tablet Take 800 mg by mouth two (2) times a day. May take addition 1/2 - 1 tab in the afternoon as needed      cyanocobalamin (VITAMIN B12) 1,000 mcg/mL injection 1,000 mcg by IntraMUSCular route every fourteen (14) days. Allergies   Allergen Reactions    Morphine Anaphylaxis and Other (comments)     Reaction Type: Allergy;  Severity: High; Reaction(s): Respiratory depression       Family History   Problem Relation Age of Onset    Cancer Mother         breast    SLE Mother     Breast Cancer Mother         35s    Emphysema Father     Breast Cancer Maternal Grandmother         late 62s    Cancer Maternal Grandmother      Social History     Tobacco Use    Smoking status: Never    Smokeless tobacco: Never   Substance Use Topics    Alcohol use: No         Coco Alba MD [place]

## 2023-01-31 LAB
25(OH)D3 SERPL-MCNC: 66.1 NG/ML (ref 30–100)
ALBUMIN SERPL-MCNC: 4 G/DL (ref 3.5–5)
ALBUMIN/GLOB SERPL: 1.1 (ref 1.1–2.2)
ALP SERPL-CCNC: 84 U/L (ref 45–117)
ALT SERPL-CCNC: 28 U/L (ref 12–78)
ANION GAP SERPL CALC-SCNC: 2 MMOL/L (ref 5–15)
AST SERPL-CCNC: 22 U/L (ref 15–37)
BILIRUB SERPL-MCNC: 0.4 MG/DL (ref 0.2–1)
BUN SERPL-MCNC: 35 MG/DL (ref 6–20)
BUN/CREAT SERPL: 16 (ref 12–20)
CALCIUM SERPL-MCNC: 10.8 MG/DL (ref 8.5–10.1)
CHLORIDE SERPL-SCNC: 107 MMOL/L (ref 97–108)
CHOLEST SERPL-MCNC: 281 MG/DL
CO2 SERPL-SCNC: 30 MMOL/L (ref 21–32)
CREAT SERPL-MCNC: 2.21 MG/DL (ref 0.55–1.02)
ERYTHROCYTE [DISTWIDTH] IN BLOOD BY AUTOMATED COUNT: 14.1 % (ref 11.5–14.5)
EST. AVERAGE GLUCOSE BLD GHB EST-MCNC: 117 MG/DL
GLOBULIN SER CALC-MCNC: 3.6 G/DL (ref 2–4)
GLUCOSE SERPL-MCNC: 97 MG/DL (ref 65–100)
HBA1C MFR BLD: 5.7 % (ref 4–5.6)
HCT VFR BLD AUTO: 38.8 % (ref 35–47)
HDLC SERPL-MCNC: 60 MG/DL
HDLC SERPL: 4.7 (ref 0–5)
HGB BLD-MCNC: 11.8 G/DL (ref 11.5–16)
LDLC SERPL CALC-MCNC: 152 MG/DL (ref 0–100)
MCH RBC QN AUTO: 25.6 PG (ref 26–34)
MCHC RBC AUTO-ENTMCNC: 30.4 G/DL (ref 30–36.5)
MCV RBC AUTO: 84.2 FL (ref 80–99)
NRBC # BLD: 0 K/UL (ref 0–0.01)
NRBC BLD-RTO: 0 PER 100 WBC
PLATELET # BLD AUTO: 319 K/UL (ref 150–400)
PMV BLD AUTO: 11.2 FL (ref 8.9–12.9)
POTASSIUM SERPL-SCNC: 4.2 MMOL/L (ref 3.5–5.1)
PROT SERPL-MCNC: 7.6 G/DL (ref 6.4–8.2)
RBC # BLD AUTO: 4.61 M/UL (ref 3.8–5.2)
SODIUM SERPL-SCNC: 139 MMOL/L (ref 136–145)
TRIGL SERPL-MCNC: 345 MG/DL (ref ?–150)
TSH SERPL DL<=0.05 MIU/L-ACNC: 0.76 UIU/ML (ref 0.36–3.74)
VLDLC SERPL CALC-MCNC: 69 MG/DL
WBC # BLD AUTO: 10.2 K/UL (ref 3.6–11)

## 2023-02-03 DIAGNOSIS — E83.52 SERUM CALCIUM ELEVATED: ICD-10-CM

## 2023-02-03 DIAGNOSIS — R79.89 ELEVATED SERUM CREATININE: Primary | ICD-10-CM

## 2023-02-03 NOTE — PROGRESS NOTES
verified. Informed pt of message from provider. Pt does not want to increase cholesterol medication and will work on diet and exercise. Pt made a lab appt for 23. Pt will need orders.

## 2023-02-17 ENCOUNTER — LAB ONLY (OUTPATIENT)
Dept: FAMILY MEDICINE CLINIC | Age: 63
End: 2023-02-17

## 2023-02-17 DIAGNOSIS — R79.89 ELEVATED SERUM CREATININE: ICD-10-CM

## 2023-02-17 DIAGNOSIS — E83.52 SERUM CALCIUM ELEVATED: ICD-10-CM

## 2023-02-18 LAB
ALBUMIN SERPL-MCNC: 3.9 G/DL (ref 3.5–5)
ALBUMIN/GLOB SERPL: 1.1 (ref 1.1–2.2)
ALP SERPL-CCNC: 95 U/L (ref 45–117)
ALT SERPL-CCNC: 27 U/L (ref 12–78)
ANION GAP SERPL CALC-SCNC: 6 MMOL/L (ref 5–15)
AST SERPL-CCNC: 21 U/L (ref 15–37)
BILIRUB SERPL-MCNC: 0.4 MG/DL (ref 0.2–1)
BUN SERPL-MCNC: 36 MG/DL (ref 6–20)
BUN/CREAT SERPL: 17 (ref 12–20)
CALCIUM SERPL-MCNC: 10.2 MG/DL (ref 8.5–10.1)
CALCIUM SERPL-MCNC: 10.4 MG/DL (ref 8.5–10.1)
CHLORIDE SERPL-SCNC: 108 MMOL/L (ref 97–108)
CO2 SERPL-SCNC: 28 MMOL/L (ref 21–32)
CREAT SERPL-MCNC: 2.18 MG/DL (ref 0.55–1.02)
GLOBULIN SER CALC-MCNC: 3.4 G/DL (ref 2–4)
GLUCOSE SERPL-MCNC: 96 MG/DL (ref 65–100)
POTASSIUM SERPL-SCNC: 4.3 MMOL/L (ref 3.5–5.1)
PROT SERPL-MCNC: 7.3 G/DL (ref 6.4–8.2)
PTH-INTACT SERPL-MCNC: 26.8 PG/ML (ref 18.4–88)
SODIUM SERPL-SCNC: 142 MMOL/L (ref 136–145)

## 2023-04-21 RX ORDER — PRAVASTATIN SODIUM 20 MG/1
TABLET ORAL
Qty: 90 TABLET | Refills: 0 | Status: SHIPPED | OUTPATIENT
Start: 2023-04-21

## 2023-06-29 NOTE — TELEPHONE ENCOUNTER
Called and notified pt per Dr. Alejandra Billings interacts with your azathioprine so your kidney doctor would have to dose it. In the meantime,Dr. Suzette Tejeda is sending a steroid pack to the pharmacy to get the gout to calm down. Pt verbalized understanding.
Called pt. She stated that she's had gout in her foot for 2-3 days and it's now in her hands. Stated her hands are stiff and painful. She has taken Uloric in the past for gout and wanted to know if something could be called in for her. Informed her that I will ask Dr. Meghan Quinn and call her back.
Pt is calling stating she has gout in right foot and hands she doesn't know if it is coming from med or what and would like something called in for her
Uloric interacts with her azathioprine so her kidney doctor would have to dose it. In the meantime, i'm sending a steroid pack to the pharmacy to get the gout to calm down.
Admitted

## 2023-07-19 RX ORDER — PRAVASTATIN SODIUM 20 MG
TABLET ORAL
Qty: 90 TABLET | Refills: 1 | Status: SHIPPED | OUTPATIENT
Start: 2023-07-19

## 2023-10-17 ENCOUNTER — TRANSCRIBE ORDERS (OUTPATIENT)
Facility: HOSPITAL | Age: 63
End: 2023-10-17

## 2023-10-17 DIAGNOSIS — D04.4 CARCINOMA IN SITU OF SCALP AND SKIN OF NECK: Primary | ICD-10-CM

## 2023-10-18 ENCOUNTER — HOSPITAL ENCOUNTER (OUTPATIENT)
Facility: HOSPITAL | Age: 63
Discharge: HOME | End: 2023-10-21
Payer: MEDICARE

## 2023-10-18 ENCOUNTER — TRANSCRIBE ORDERS (OUTPATIENT)
Facility: HOSPITAL | Age: 63
End: 2023-10-18

## 2023-10-18 DIAGNOSIS — C80.1 MALIGNANT NEOPLASM (HCC): Primary | ICD-10-CM

## 2023-10-18 DIAGNOSIS — C80.1 MALIGNANCY (HCC): ICD-10-CM

## 2023-10-18 DIAGNOSIS — C80.1 MALIGNANT NEOPLASM (HCC): ICD-10-CM

## 2023-10-18 DIAGNOSIS — D04.4 CARCINOMA IN SITU OF SCALP AND SKIN OF NECK: ICD-10-CM

## 2023-10-18 DIAGNOSIS — C80.1 NEOPLASM/CANCER (HCC): ICD-10-CM

## 2023-10-18 DIAGNOSIS — C80.1 NEOPLASM/CANCER (HCC): Primary | ICD-10-CM

## 2023-10-18 DIAGNOSIS — D04.4 SQUAMOUS CELL CARCINOMA IN SITU (SCCIS) OF SCALP: Primary | ICD-10-CM

## 2023-10-18 DIAGNOSIS — C80.1 MALIGNANCY (HCC): Primary | ICD-10-CM

## 2023-10-18 LAB — CREAT BLD-MCNC: 2.6 MG/DL (ref 0.6–1.3)

## 2023-10-18 PROCEDURE — 71250 CT THORAX DX C-: CPT

## 2023-10-18 PROCEDURE — 70490 CT SOFT TISSUE NECK W/O DYE: CPT

## 2023-10-18 PROCEDURE — 82565 ASSAY OF CREATININE: CPT

## 2023-10-18 PROCEDURE — 70450 CT HEAD/BRAIN W/O DYE: CPT

## 2023-11-08 NOTE — PROGRESS NOTES
Chief Complaint   Patient presents with    Medication Evaluation    Abdominal Pain     Health Maintenance reviewed     1. Have you been to the ER, urgent care clinic since your last visit? Hospitalized since your last visit? No     2. Have you seen or consulted any other health care providers outside of the [address] [place] [address] since your last visit? Include any pap smears or colon screening. No     Pt gave verbal consent for student observation. ID ATTENDING ADDENDUM:    Patient seen and examined. More than 50% of clinical time and 100% of MDM was performed by me.  Agree with below.      Patient remains afebrile, clinically stable. Sputum cx with corynebacterium, stenotrophomonas, and acinetobacter but suspect colinization as he has been afebrile without leukocytosis and CXR improved. Continue augmentin, day 6 of 10. Okay for DC from ID perspective.     Caitlyn Lui MD  ------------------------------------------------------------------------------------------        INFECTIOUS DISEASE PROGRESS NOTE    ASSESSMENT:   # LLL Pneumonia  - Presented with respiratory distress, coarse breath sounds  - CXR and CT chest with persistent dense LLL consolidation  - May benefit from longer course of antibiotics for 10-14 days  - Likely also has component of volume overload  # Respiratory distress  # Chronic respiratory failure s/p trach vent dependent  # PVD s/p B BKA   # ESRD on HD  # Large sacral decubitus ulcer  # MDRO, VRE, PSAR and C auris colonization  # Comorbidities: cardiac arrest, anoxic brain injury, PAF, hypothyroidism, HLD, PE on Eliquis, CVA    PLAN:   Completed  Avycaz  11/3-->  Continue augmentin for left lower lobe pneumonia to complete 10  Days of therapy. Day 6 of 10  11/6 sputum culture with -ms MDR and Acinetobacter, pending.  Likely reflective of colonization.  Will monitor closely  Patient remains afebrile, no leukocytosis on recent labs, stable vent settings.  11/3 blood culture NGTD  11/3 MRSA PCR negative  Respiratory viral panel negative  Continue pulmonary hygiene      Elle Hogue NP  [phone]    -------------------------------------------------------------------------------------------------------------------    SUBJECTIVE:  Patient is awake, he does not follow commands, he does not track.  Noted with clear oral secretions. Seen in  dialysis.    OBJECTIVE:  Tmax Temp (24hrs), Av.1 °F (36.7 °C), Min:97 °F (36.1 °C), Max:99 °F  (37.2 °C)     Last Vitals   Vitals:    11/08/23 0820   BP: 126/72   Pulse: 78   Resp: 19   Temp: 98.6 °F (37 °C)       Gen: NAD, awake, non-responsive  HEENT: anicteric, thin clear oral secretions pooling from mouth, trach midline to vent site clean  CV: RRR, left chest HD catheter site clean  Pulm: Decreased breath sounds, no increased respiratory effort  Abd: soft, non-tender, non-distended, positive bowel sounds, +PEG site clean  Ext: left BKA, right AKA, right distal index finger with necrosis, left arm AVF  Psych: non-responsive  Skin: sacral decubitus ulcer    ANTIMICROBIALS  Avycaz    LAB:  Recent Labs     11/06/23  0554 11/07/23  0606   WBC 9.2 8.1   HGB 8.3* 8.8*   HCT 26.4* 28.3*    166   .9* 102.5*       Recent Labs   Lab 11/07/23  0606 11/06/23  0554 11/05/23  0632   SODIUM 131* 134* 134*   POTASSIUM 3.7 5.1 3.8   CHLORIDE 97 101 101   CO2 27 24 25   BUN 55* 41* 27*   CREATININE 3.19* 2.63* 1.88*   GLUCOSE 139* 136* 134*   CALCIUM 9.0 9.0 9.3       Microbiology Results  (Last 10 results in the past 7 days)    Specimen   Gram Smear   Culture Result   Status       11/06/23  0531         Adequate quality specimen. Acute inflammation with moderate/many neutrophils. Mixed bacteria with no predominant type.  [P]           11/06/23  0019         Inadequate quality specimen. Heavy oral contamination. No culture performed. Recommend recollection if clinically indicated.                 [P] - Preliminary Result               RADIOLOGY: images reviewed     Results for orders placed or performed during the hospital encounter of 11/03/23 (from the past 72 hour(s))   XR CHEST AP OR PA    Impression    Significant improvement in retrocardiac consolidation and left pleural  effusion. Small left effusion and left basilar opacity versus residual  pneumonia remain.          Electronically Signed by: SONJA MALDONADO MD   Signed on: 11/6/2023 4:09 PM   Workstation ID: [id number]       Note:  Assessment and  Plan have been moved to the top of the screen for ease of the viewer such that the plan can be seen without scrolling to the bottom of the note.

## 2023-11-17 RX ORDER — PRAVASTATIN SODIUM 20 MG
TABLET ORAL
Qty: 90 TABLET | Refills: 0 | Status: SHIPPED | OUTPATIENT
Start: 2023-11-17

## 2024-01-16 ENCOUNTER — HOSPITAL ENCOUNTER (OUTPATIENT)
Facility: HOSPITAL | Age: 64
Discharge: HOME | End: 2024-01-19
Attending: FAMILY MEDICINE
Payer: COMMERCIAL

## 2024-01-16 VITALS — WEIGHT: 171.08 LBS | BODY MASS INDEX: 25.93 KG/M2 | HEIGHT: 68 IN

## 2024-01-16 DIAGNOSIS — Z12.31 VISIT FOR SCREENING MAMMOGRAM: ICD-10-CM

## 2024-01-16 PROCEDURE — 77063 BREAST TOMOSYNTHESIS BI: CPT

## 2024-02-09 DIAGNOSIS — K21.9 GASTRO-ESOPHAGEAL REFLUX DISEASE WITHOUT ESOPHAGITIS: ICD-10-CM

## 2024-02-09 RX ORDER — PANTOPRAZOLE SODIUM 40 MG/1
TABLET, DELAYED RELEASE ORAL
Qty: 90 TABLET | Refills: 0 | Status: SHIPPED | OUTPATIENT
Start: 2024-02-09

## 2024-02-29 ENCOUNTER — OFFICE VISIT (OUTPATIENT)
Age: 64
End: 2024-02-29
Payer: COMMERCIAL

## 2024-02-29 VITALS
HEART RATE: 89 BPM | RESPIRATION RATE: 18 BRPM | WEIGHT: 169.7 LBS | TEMPERATURE: 97.7 F | SYSTOLIC BLOOD PRESSURE: 124 MMHG | HEIGHT: 67 IN | BODY MASS INDEX: 26.63 KG/M2 | DIASTOLIC BLOOD PRESSURE: 66 MMHG

## 2024-02-29 DIAGNOSIS — G62.0 CHEMOTHERAPY-INDUCED NEUROPATHY (HCC): ICD-10-CM

## 2024-02-29 DIAGNOSIS — N18.4 CKD (CHRONIC KIDNEY DISEASE) STAGE 4, GFR 15-29 ML/MIN (HCC): ICD-10-CM

## 2024-02-29 DIAGNOSIS — T45.1X5A CHEMOTHERAPY-INDUCED NEUROPATHY (HCC): Primary | ICD-10-CM

## 2024-02-29 DIAGNOSIS — G60.8 IDIOPATHIC SMALL AND LARGE FIBER SENSORY NEUROPATHY: ICD-10-CM

## 2024-02-29 DIAGNOSIS — G62.0 CHEMOTHERAPY-INDUCED NEUROPATHY (HCC): Primary | ICD-10-CM

## 2024-02-29 DIAGNOSIS — T45.1X5A CHEMOTHERAPY-INDUCED NEUROPATHY (HCC): ICD-10-CM

## 2024-02-29 DIAGNOSIS — G25.81 RLS (RESTLESS LEGS SYNDROME): ICD-10-CM

## 2024-02-29 PROCEDURE — G8427 DOCREV CUR MEDS BY ELIG CLIN: HCPCS | Performed by: PSYCHIATRY & NEUROLOGY

## 2024-02-29 PROCEDURE — G8419 CALC BMI OUT NRM PARAM NOF/U: HCPCS | Performed by: PSYCHIATRY & NEUROLOGY

## 2024-02-29 PROCEDURE — 3017F COLORECTAL CA SCREEN DOC REV: CPT | Performed by: PSYCHIATRY & NEUROLOGY

## 2024-02-29 PROCEDURE — G8484 FLU IMMUNIZE NO ADMIN: HCPCS | Performed by: PSYCHIATRY & NEUROLOGY

## 2024-02-29 PROCEDURE — 1036F TOBACCO NON-USER: CPT | Performed by: PSYCHIATRY & NEUROLOGY

## 2024-02-29 PROCEDURE — 99204 OFFICE O/P NEW MOD 45 MIN: CPT | Performed by: PSYCHIATRY & NEUROLOGY

## 2024-02-29 RX ORDER — SIROLIMUS 1 MG/1
2 TABLET, FILM COATED ORAL DAILY
COMMUNITY

## 2024-02-29 RX ORDER — GABAPENTIN 800 MG/1
800 TABLET ORAL 3 TIMES DAILY
Qty: 270 TABLET | Refills: 1 | Status: SHIPPED | OUTPATIENT
Start: 2024-02-29 | End: 2024-03-02 | Stop reason: SDUPTHER

## 2024-02-29 RX ORDER — FUROSEMIDE 20 MG/1
20 TABLET ORAL 2 TIMES DAILY
COMMUNITY

## 2024-02-29 RX ORDER — PREDNISOLONE 5 MG/1
TABLET ORAL
COMMUNITY

## 2024-02-29 RX ORDER — MULTIVIT-MIN/IRON/FOLIC ACID/K 18-600-40
CAPSULE ORAL
COMMUNITY

## 2024-02-29 RX ORDER — PRAMIPEXOLE DIHYDROCHLORIDE 0.25 MG/1
0.25 TABLET ORAL NIGHTLY
Qty: 90 TABLET | Refills: 3 | Status: SHIPPED | OUTPATIENT
Start: 2024-02-29

## 2024-02-29 RX ORDER — ROSUVASTATIN CALCIUM 10 MG/1
10 TABLET, COATED ORAL DAILY
COMMUNITY

## 2024-02-29 RX ORDER — AMLODIPINE BESYLATE 2.5 MG/1
2.5 TABLET ORAL DAILY
COMMUNITY

## 2024-02-29 ASSESSMENT — PATIENT HEALTH QUESTIONNAIRE - PHQ9
SUM OF ALL RESPONSES TO PHQ QUESTIONS 1-9: 0
2. FEELING DOWN, DEPRESSED OR HOPELESS: 0
SUM OF ALL RESPONSES TO PHQ9 QUESTIONS 1 & 2: 0
1. LITTLE INTEREST OR PLEASURE IN DOING THINGS: 0

## 2024-03-01 NOTE — PROGRESS NOTES
rosuvastatin (CRESTOR) 10 MG tablet, Take 1 tablet by mouth daily, Disp: , Rfl:     furosemide (LASIX) 20 MG tablet, Take 1 tablet by mouth 2 times daily, Disp: , Rfl:     Cholecalciferol (VITAMIN D) 50 MCG (2000 UT) CAPS capsule, Take by mouth, Disp: , Rfl:     gabapentin (NEURONTIN) 800 MG tablet, Take 1 tablet by mouth 3 times daily for 182 days. Max Daily Amount: 2,400 mg, Disp: 270 tablet, Rfl: 1    pramipexole (MIRAPEX) 0.25 MG tablet, Take 1 tablet by mouth nightly, Disp: 90 tablet, Rfl: 3    pantoprazole (PROTONIX) 40 MG tablet, TAKE 1 TABLET BY MOUTH EVERY DAY, Disp: 90 tablet, Rfl: 0    pravastatin (PRAVACHOL) 20 MG tablet, TAKE 1 TABLET BY MOUTH EVERY DAY AT NIGHT, Disp: 90 tablet, Rfl: 0    cyanocobalamin 1000 MCG/ML injection, Inject 1 mL into the muscle every 14 days, Disp: , Rfl:     famotidine (PEPCID) 10 MG tablet, Take 1 tablet by mouth 2 times daily, Disp: , Rfl:     predniSONE (DELTASONE) 5 MG tablet, Take 0.5 tablets by mouth daily, Disp: , Rfl:         Allergies   Allergen Reactions    Morphine Anaphylaxis and Other (See Comments)     Reaction Type: Allergy; Severity: High; Reaction(s): Respiratory depression      MRI Results (most recent):  @[facility](LZC7478:1)@    @[facility]([id number]:1)@  Review of Systems:  A comprehensive review of systems was negative except for: Constitutional: positive for fatigue and malaise  Musculoskeletal: positive for arthralgias, myalgias, and stiff joints  Neurological: positive for paresthesia, weakness, and restless leg syndrome    Vitals:    02/29/24 0854   BP: 124/66   Site: Right Upper Arm   Position: Sitting   Cuff Size: Medium Adult   Pulse: 89   Resp: 18   Temp: 97.7 °F (36.5 °C)   TempSrc: Temporal   Weight: 77 kg (169 lb 11.2 oz)   Height: 1.702 m (5' 7\")     Objective:     I      NEUROLOGICAL EXAM:    Appearance:  The patient is fairly developed, well nourished, provides a coherent history and is in no acute distress.   Mental Status:

## 2024-03-02 ENCOUNTER — CLINICAL DOCUMENTATION (OUTPATIENT)
Age: 64
End: 2024-03-02

## 2024-03-02 DIAGNOSIS — G60.8 IDIOPATHIC SMALL AND LARGE FIBER SENSORY NEUROPATHY: ICD-10-CM

## 2024-03-02 DIAGNOSIS — T45.1X5A CHEMOTHERAPY-INDUCED NEUROPATHY (HCC): ICD-10-CM

## 2024-03-02 DIAGNOSIS — G25.81 RLS (RESTLESS LEGS SYNDROME): ICD-10-CM

## 2024-03-02 DIAGNOSIS — G62.0 CHEMOTHERAPY-INDUCED NEUROPATHY (HCC): ICD-10-CM

## 2024-03-02 LAB — GABAPENTIN SERPLBLD-MCNC: 25.9 UG/ML (ref 4–16)

## 2024-03-02 RX ORDER — GABAPENTIN 800 MG/1
TABLET ORAL
Qty: 180 TABLET | Refills: 1
Start: 2024-03-02 | End: 2024-09-02

## 2024-03-02 NOTE — PROGRESS NOTES
I called the patient, advised her Neurontin level was high she should cut down her morning dose to 400 mg in the morning and 100 mg at night when she has most of the pain, and see if she can get by with a lower dose, and try not to increase it to 3 times a day.  She said she will do that and she agrees with plans and therapy will call if there is any problem.

## 2024-07-25 DIAGNOSIS — T45.1X5A CHEMOTHERAPY-INDUCED NEUROPATHY (HCC): ICD-10-CM

## 2024-07-25 DIAGNOSIS — G25.81 RLS (RESTLESS LEGS SYNDROME): ICD-10-CM

## 2024-07-25 DIAGNOSIS — G60.8 IDIOPATHIC SMALL AND LARGE FIBER SENSORY NEUROPATHY: ICD-10-CM

## 2024-07-25 DIAGNOSIS — G62.0 CHEMOTHERAPY-INDUCED NEUROPATHY (HCC): ICD-10-CM

## 2024-07-25 RX ORDER — PRAMIPEXOLE DIHYDROCHLORIDE 0.25 MG/1
TABLET ORAL
Qty: 90 TABLET | Refills: 0 | OUTPATIENT
Start: 2024-07-25

## 2024-08-08 RX ORDER — CYANOCOBALAMIN 1000 UG/ML
1000 INJECTION, SOLUTION INTRAMUSCULAR; SUBCUTANEOUS ONCE
Status: CANCELLED | OUTPATIENT
Start: 2024-08-08 | End: 2024-08-08

## 2024-08-08 RX ORDER — CYANOCOBALAMIN 1000 UG/ML
1000 INJECTION, SOLUTION INTRAMUSCULAR; SUBCUTANEOUS
Qty: 25 ML | Refills: 1 | Status: SHIPPED | OUTPATIENT
Start: 2024-08-08 | End: 2024-11-04 | Stop reason: SDUPTHER

## 2024-11-04 ENCOUNTER — OFFICE VISIT (OUTPATIENT)
Age: 64
End: 2024-11-04
Payer: MEDICARE

## 2024-11-04 VITALS
RESPIRATION RATE: 15 BRPM | OXYGEN SATURATION: 97 % | HEIGHT: 67 IN | HEART RATE: 59 BPM | SYSTOLIC BLOOD PRESSURE: 110 MMHG | BODY MASS INDEX: 24.17 KG/M2 | WEIGHT: 154 LBS | DIASTOLIC BLOOD PRESSURE: 62 MMHG

## 2024-11-04 DIAGNOSIS — T45.1X5A CHEMOTHERAPY-INDUCED NEUROPATHY (HCC): Primary | ICD-10-CM

## 2024-11-04 DIAGNOSIS — G25.81 RLS (RESTLESS LEGS SYNDROME): ICD-10-CM

## 2024-11-04 DIAGNOSIS — G62.0 CHEMOTHERAPY-INDUCED NEUROPATHY (HCC): Primary | ICD-10-CM

## 2024-11-04 PROCEDURE — G8427 DOCREV CUR MEDS BY ELIG CLIN: HCPCS

## 2024-11-04 PROCEDURE — G8420 CALC BMI NORM PARAMETERS: HCPCS

## 2024-11-04 PROCEDURE — 99215 OFFICE O/P EST HI 40 MIN: CPT

## 2024-11-04 PROCEDURE — 1036F TOBACCO NON-USER: CPT

## 2024-11-04 PROCEDURE — G8484 FLU IMMUNIZE NO ADMIN: HCPCS

## 2024-11-04 PROCEDURE — 3017F COLORECTAL CA SCREEN DOC REV: CPT

## 2024-11-04 RX ORDER — GABAPENTIN 800 MG/1
1200 TABLET ORAL DAILY
Qty: 135 TABLET | Refills: 4 | Status: SHIPPED | OUTPATIENT
Start: 2024-11-04 | End: 2026-01-28

## 2024-11-04 RX ORDER — BUMETANIDE 2 MG/1
2 TABLET ORAL 2 TIMES DAILY
COMMUNITY
Start: 2024-09-05

## 2024-11-04 RX ORDER — CARVEDILOL 6.25 MG/1
6.25 TABLET ORAL 2 TIMES DAILY WITH MEALS
COMMUNITY
Start: 2024-05-03

## 2024-11-04 RX ORDER — ALLOPURINOL 100 MG/1
100 TABLET ORAL 2 TIMES DAILY
COMMUNITY

## 2024-11-04 RX ORDER — CYANOCOBALAMIN 1000 UG/ML
1000 INJECTION, SOLUTION INTRAMUSCULAR; SUBCUTANEOUS
Qty: 25 ML | Refills: 1 | Status: SHIPPED | OUTPATIENT
Start: 2024-11-04

## 2024-11-04 ASSESSMENT — PATIENT HEALTH QUESTIONNAIRE - PHQ9
2. FEELING DOWN, DEPRESSED OR HOPELESS: NOT AT ALL
SUM OF ALL RESPONSES TO PHQ QUESTIONS 1-9: 0
SUM OF ALL RESPONSES TO PHQ9 QUESTIONS 1 & 2: 0
SUM OF ALL RESPONSES TO PHQ QUESTIONS 1-9: 0
SUM OF ALL RESPONSES TO PHQ QUESTIONS 1-9: 0
1. LITTLE INTEREST OR PLEASURE IN DOING THINGS: NOT AT ALL
SUM OF ALL RESPONSES TO PHQ QUESTIONS 1-9: 0

## 2024-11-04 NOTE — PATIENT INSTRUCTIONS
As per our discussion,    Based on your kidney issues, we are very limited on what we can give you.  Given you are going to see your nephrologist in the coming weeks, I would advise that you discuss this with them and based on your kidney function maybe you can restart on a slightly higher dose than what you getting.  I have sent prescription to your pharmacy for gabapentin 400 mg in which you can take 1 tablet in the morning and 2 tablets at bedtime.    Due to the limitation, I will refer you to pain management for evaluation.    I encouraged that you continue to follow-up with your primary care provider and your other specialists for your comorbid condition as appropriate.    I encourage you to continue to remain active.  Adopt healthy lifestyles which include nutrition and exercise to help alleviate your neuropathic symptoms.    For restless leg syndrome, I will not make any changes on Mirapex.  Please continue to take Mirapex as prescribed.    It was a pleasure to meet both of you today    Your next appointment is scheduled with Dr. Burris on 3/3/2025    Please do not hesitate to reach out for any questions or concerns

## 2024-11-04 NOTE — PROGRESS NOTES
[facility]  In Office FOLLOW-UP VISIT         Rosalina Daniel is a 64 y.o. female who presents today for the following:  Chief Complaint   Patient presents with    Leg Pain     Burning in lower back , legs , feet get numb, trouble picking up legs, no strength - no falls         ASSESSMENT AND PLAN  Patient is known to this practice.  This is my first time seeing the patient.  Chart and history reviewed in detail at today's office visit.    1. Chemotherapy-induced neuropathy (HCC)  Assessment & Plan:  Symptoms used to be stable on gabapentin 800 mg 3 times daily, however, dosage was decreased due to kidney function.    Since the changes in gabapentin, her symptoms seem to get worse.    Patient followed by nephrology and she is currently on the transplant list.    Will not make any changes on her current medication at this time.  She is to continue take gabapentin 800 mg in which she takes 400 mg in the morning and 800 mg at bedtime as recommended by her other specialists.    Given her kidney issues, will refer her to pain management for other options that would help alleviate her symptoms.    Patient was encouraged to remain active.  Adopt healthy lifestyles which includes nutrition and exercise to help elevate her neuropathic symptoms.    Fall safety was discussed the patient.    Orders:  -     Amb External Referral To Physical Medicine Rehab  -     cyanocobalamin 1000 MCG/ML injection; Inject 1 mL into the muscle every 14 days, Disp-25 mL, R-1Normal  -     gabapentin (NEURONTIN) 800 MG tablet; Take 1.5 tablets by mouth daily. Take 400 mg in the morning and 800 mg at night Max Daily Amount: 1,200 mg, Disp-135 tablet, R-4Normal  2. RLS (restless legs syndrome)  Assessment & Plan:   Stable on current regimen.    She is to continue on Mirapex 0.25 mg nightly.    Orders:  -     gabapentin (NEURONTIN) 800 MG tablet; Take 1.5 tablets by mouth daily. Take 400 mg in the morning and 800

## 2024-11-04 NOTE — PROGRESS NOTES
1. Have you been to the ER, urgent care clinic since your last visit?  Hospitalized since your last visit?  No.      2. Have you seen or consulted any other health care providers outside of the [facility] since your last visit?  Include any pap smears or colon screening.   No.      Chief Complaint   Patient presents with    Leg Pain     Burning in lower back , legs , feet get numb, trouble picking up legs, no strength - no falls

## 2024-11-06 ENCOUNTER — CLINICAL DOCUMENTATION (OUTPATIENT)
Age: 64
End: 2024-11-06

## 2024-11-06 ASSESSMENT — ENCOUNTER SYMPTOMS
RESPIRATORY NEGATIVE: 1
EYES NEGATIVE: 1
GASTROINTESTINAL NEGATIVE: 1
ALLERGIC/IMMUNOLOGIC NEGATIVE: 1

## 2024-11-06 NOTE — PROGRESS NOTES
Faxed referral and last office note to Dr.Charles Hayes office at [phone] and received fax confirmation. Placed copy in fast scan.    For pain management.

## 2024-11-06 NOTE — ASSESSMENT & PLAN NOTE
Symptoms used to be stable on gabapentin 800 mg 3 times daily, however, dosage was decreased due to kidney function.    Since the changes in gabapentin, her symptoms seem to get worse.    Patient followed by nephrology and she is currently on the transplant list.    Will not make any changes on her current medication at this time.  She is to continue take gabapentin 800 mg in which she takes 400 mg in the morning and 800 mg at bedtime as recommended by her other specialists.    Given her kidney issues, will refer her to pain management for other options that would help alleviate her symptoms.    She is to continue on vitamin B12 supplementation.    Patient was encouraged to remain active.  Adopt healthy lifestyles which includes nutrition and exercise to help elevate her neuropathic symptoms.    Fall safety was discussed the patient.

## 2024-11-08 ENCOUNTER — TELEPHONE (OUTPATIENT)
Age: 64
End: 2024-11-08

## 2024-11-08 NOTE — TELEPHONE ENCOUNTER
Ag[facility] Youssef [facility] is calling because they would like the last office visit notes from Dr. Burris.     [phone]  PHONE

## 2025-01-17 ENCOUNTER — TRANSCRIBE ORDERS (OUTPATIENT)
Facility: HOSPITAL | Age: 65
End: 2025-01-17

## 2025-01-17 DIAGNOSIS — Z12.31 VISIT FOR SCREENING MAMMOGRAM: Primary | ICD-10-CM

## 2025-01-28 DIAGNOSIS — G25.81 RLS (RESTLESS LEGS SYNDROME): ICD-10-CM

## 2025-01-28 DIAGNOSIS — G60.8 IDIOPATHIC SMALL AND LARGE FIBER SENSORY NEUROPATHY: ICD-10-CM

## 2025-01-28 DIAGNOSIS — T45.1X5A CHEMOTHERAPY-INDUCED NEUROPATHY (HCC): ICD-10-CM

## 2025-01-28 DIAGNOSIS — G62.0 CHEMOTHERAPY-INDUCED NEUROPATHY (HCC): ICD-10-CM

## 2025-01-28 RX ORDER — PRAMIPEXOLE DIHYDROCHLORIDE 0.25 MG/1
0.25 TABLET ORAL NIGHTLY
Qty: 90 TABLET | Refills: 3 | Status: SHIPPED | OUTPATIENT
Start: 2025-01-28

## 2025-03-13 ENCOUNTER — TELEPHONE (OUTPATIENT)
Age: 65
End: 2025-03-13

## 2025-03-13 DIAGNOSIS — T45.1X5A CHEMOTHERAPY-INDUCED NEUROPATHY: ICD-10-CM

## 2025-03-13 DIAGNOSIS — G62.0 CHEMOTHERAPY-INDUCED NEUROPATHY: ICD-10-CM

## 2025-03-13 DIAGNOSIS — G25.81 RLS (RESTLESS LEGS SYNDROME): ICD-10-CM

## 2025-03-13 RX ORDER — GABAPENTIN 100 MG/1
CAPSULE ORAL
Qty: 180 CAPSULE | Refills: 1 | Status: SHIPPED | OUTPATIENT
Start: 2025-03-13 | End: 2025-09-13

## 2025-03-13 NOTE — TELEPHONE ENCOUNTER
Patients  Marcelo called states that pt was recently in the hospital and they made changes to her gabapentin 800 mg to taking 100 mg 2 times daily. Please send to the [id number] - [place], [place] - [address] - P [phone] - F [phone]. Pt can be reached at [phone]

## 2025-03-13 NOTE — TELEPHONE ENCOUNTER
Returned call,spoke to  Marcelo on PHI, he advised pt was in hospital for 28 days with pneumonia. She go out of hospital on 2/28 .She is currently on Dialysis and they changed her gabapentin to 100 mg BID. They have enough medication to last until 3/25 but they will need a new Rx sent to pharmacy after that. Advised I will send to Mer to send in the new Rx. If there is an issue then I will call back, if he does not hear from me then they can assume that the Rx went to the pharmacy. Marcelo verbalized understanding.

## 2025-03-14 NOTE — TELEPHONE ENCOUNTER
Message  Received: Yesterday  Mer Pineda APRN - Lindsay Oconnell, LPN  Caller: Unspecified (Yesterday, 10:20 AM)  Prescription was sent to pharmacy.    Thank you      Noted, Rx was sent to pharmacy.

## 2025-08-22 ENCOUNTER — TRANSCRIBE ORDERS (OUTPATIENT)
Facility: HOSPITAL | Age: 65
End: 2025-08-22

## 2025-08-22 DIAGNOSIS — K86.89 DILATION OF PANCREATIC DUCT: ICD-10-CM

## 2025-08-22 DIAGNOSIS — K83.8 DILATION OF COMMON BILE DUCT: ICD-10-CM

## 2025-08-22 DIAGNOSIS — R93.2 ABNORMAL FINDINGS ON DIAGNOSTIC IMAGING OF LIVER AND BILIARY TRACT: Primary | ICD-10-CM

## (undated) DEVICE — HANDLE LT SNAP ON ULT DURABLE LENS FOR TRUMPF ALC DISPOSABLE

## (undated) DEVICE — STERILE POLYISOPRENE POWDER-FREE SURGICAL GLOVES: Brand: PROTEXIS

## (undated) DEVICE — DRAPE,EXTREMITY,89X128,STERILE: Brand: MEDLINE

## (undated) DEVICE — GAUZE SPONGES,12 PLY: Brand: CURITY

## (undated) DEVICE — TOWEL SURG W17XL27IN STD BLU COT NONFENESTRATED PREWASHED

## (undated) DEVICE — SYR 10ML LUER LOK 1/5ML GRAD --

## (undated) DEVICE — SYR IRR BLB 2OZ DISP BLU STRL -- CONVERT TO ITEM 357637

## (undated) DEVICE — SOLUTION IV 1000ML 0.9% SOD CHL

## (undated) DEVICE — REM POLYHESIVE ADULT PATIENT RETURN ELECTRODE: Brand: VALLEYLAB

## (undated) DEVICE — KENDALL SCD EXPRESS SLEEVES, KNEE LENGTH, MEDIUM: Brand: KENDALL SCD

## (undated) DEVICE — FRAZIER SUCTION INSTRUMENT 7 FR W/CONTROL VENT & OBTURATOR: Brand: FRAZIER

## (undated) DEVICE — DEVON™ KNEE AND BODY STRAP 60" X 3" (1.5 M X 7.6 CM): Brand: DEVON

## (undated) DEVICE — ELECTRODE NDL TOT L2.13IN EXPOSED L3CM ACT L3MM TIP

## (undated) DEVICE — DRAPE SHT 3 QTR PROXIMA 53X77 --

## (undated) DEVICE — (D)PREP SKN CHLRAPRP APPL 26ML -- CONVERT TO ITEM 371833

## (undated) DEVICE — Device

## (undated) DEVICE — GOWN,SIRUS,NONRNF,SETINSLV,XL,20/CS: Brand: MEDLINE

## (undated) DEVICE — APPLICATOR FBR TIP L6IN COT TIP WOOD SHFT SWAB 2000 PER CA

## (undated) DEVICE — INFECTION CONTROL KIT SYS

## (undated) DEVICE — NEEDLE HYPO 18GA L1.5IN PNK S STL HUB POLYPR SHLD REG BVL